# Patient Record
Sex: MALE | Race: NATIVE HAWAIIAN OR OTHER PACIFIC ISLANDER | NOT HISPANIC OR LATINO
[De-identification: names, ages, dates, MRNs, and addresses within clinical notes are randomized per-mention and may not be internally consistent; named-entity substitution may affect disease eponyms.]

---

## 2017-12-08 PROBLEM — Z00.00 ENCOUNTER FOR PREVENTIVE HEALTH EXAMINATION: Status: ACTIVE | Noted: 2017-12-08

## 2017-12-11 ENCOUNTER — APPOINTMENT (OUTPATIENT)
Dept: CARDIOLOGY | Facility: CLINIC | Age: 72
End: 2017-12-11
Payer: COMMERCIAL

## 2017-12-11 ENCOUNTER — NON-APPOINTMENT (OUTPATIENT)
Age: 72
End: 2017-12-11

## 2017-12-11 VITALS
OXYGEN SATURATION: 97 % | WEIGHT: 188 LBS | SYSTOLIC BLOOD PRESSURE: 150 MMHG | DIASTOLIC BLOOD PRESSURE: 84 MMHG | HEIGHT: 70.5 IN | HEART RATE: 61 BPM | BODY MASS INDEX: 26.62 KG/M2

## 2017-12-11 DIAGNOSIS — Z78.9 OTHER SPECIFIED HEALTH STATUS: ICD-10-CM

## 2017-12-11 DIAGNOSIS — R73.03 PREDIABETES.: ICD-10-CM

## 2017-12-11 DIAGNOSIS — Z87.19 PERSONAL HISTORY OF OTHER DISEASES OF THE DIGESTIVE SYSTEM: ICD-10-CM

## 2017-12-11 PROCEDURE — 93000 ELECTROCARDIOGRAM COMPLETE: CPT

## 2017-12-11 PROCEDURE — 99204 OFFICE O/P NEW MOD 45 MIN: CPT

## 2017-12-13 ENCOUNTER — APPOINTMENT (OUTPATIENT)
Dept: CARDIOLOGY | Facility: CLINIC | Age: 72
End: 2017-12-13
Payer: COMMERCIAL

## 2017-12-13 PROCEDURE — 93015 CV STRESS TEST SUPVJ I&R: CPT

## 2017-12-20 ENCOUNTER — APPOINTMENT (OUTPATIENT)
Dept: CARDIOLOGY | Facility: CLINIC | Age: 72
End: 2017-12-20
Payer: COMMERCIAL

## 2017-12-20 PROCEDURE — 93306 TTE W/DOPPLER COMPLETE: CPT

## 2018-01-16 ENCOUNTER — APPOINTMENT (OUTPATIENT)
Dept: CARDIOLOGY | Facility: CLINIC | Age: 73
End: 2018-01-16

## 2018-03-01 ENCOUNTER — APPOINTMENT (OUTPATIENT)
Dept: CARDIOLOGY | Facility: CLINIC | Age: 73
End: 2018-03-01
Payer: COMMERCIAL

## 2018-03-01 PROCEDURE — 93306 TTE W/DOPPLER COMPLETE: CPT

## 2018-03-22 ENCOUNTER — APPOINTMENT (OUTPATIENT)
Dept: CARDIOLOGY | Facility: CLINIC | Age: 73
End: 2018-03-22
Payer: COMMERCIAL

## 2018-03-22 VITALS
BODY MASS INDEX: 26.19 KG/M2 | OXYGEN SATURATION: 96 % | SYSTOLIC BLOOD PRESSURE: 162 MMHG | WEIGHT: 185 LBS | DIASTOLIC BLOOD PRESSURE: 90 MMHG | HEART RATE: 76 BPM | HEIGHT: 70.5 IN

## 2018-03-22 VITALS — SYSTOLIC BLOOD PRESSURE: 140 MMHG | DIASTOLIC BLOOD PRESSURE: 80 MMHG

## 2018-03-22 PROCEDURE — 93000 ELECTROCARDIOGRAM COMPLETE: CPT

## 2018-03-22 PROCEDURE — 99214 OFFICE O/P EST MOD 30 MIN: CPT

## 2018-04-02 ENCOUNTER — RX RENEWAL (OUTPATIENT)
Age: 73
End: 2018-04-02

## 2018-04-03 ENCOUNTER — APPOINTMENT (OUTPATIENT)
Dept: THORACIC SURGERY | Facility: CLINIC | Age: 73
End: 2018-04-03
Payer: COMMERCIAL

## 2018-04-03 VITALS
HEIGHT: 71 IN | OXYGEN SATURATION: 97 % | DIASTOLIC BLOOD PRESSURE: 92 MMHG | BODY MASS INDEX: 25.9 KG/M2 | TEMPERATURE: 98.7 F | RESPIRATION RATE: 15 BRPM | HEART RATE: 74 BPM | WEIGHT: 185 LBS | SYSTOLIC BLOOD PRESSURE: 158 MMHG

## 2018-04-03 DIAGNOSIS — Z85.828 PERSONAL HISTORY OF OTHER MALIGNANT NEOPLASM OF SKIN: ICD-10-CM

## 2018-04-03 PROCEDURE — 99205 OFFICE O/P NEW HI 60 MIN: CPT

## 2018-04-03 RX ORDER — LOSARTAN POTASSIUM 50 MG/1
50 TABLET, FILM COATED ORAL DAILY
Qty: 90 | Refills: 1 | Status: DISCONTINUED | COMMUNITY
Start: 2018-04-02 | End: 2018-04-03

## 2018-04-10 ENCOUNTER — APPOINTMENT (OUTPATIENT)
Dept: PULMONOLOGY | Facility: CLINIC | Age: 73
End: 2018-04-10

## 2018-04-12 ENCOUNTER — OUTPATIENT (OUTPATIENT)
Dept: OUTPATIENT SERVICES | Facility: HOSPITAL | Age: 73
LOS: 1 days | End: 2018-04-12

## 2018-04-12 VITALS
DIASTOLIC BLOOD PRESSURE: 80 MMHG | HEART RATE: 68 BPM | OXYGEN SATURATION: 97 % | WEIGHT: 182.98 LBS | SYSTOLIC BLOOD PRESSURE: 130 MMHG | TEMPERATURE: 97 F | HEIGHT: 69 IN | RESPIRATION RATE: 16 BRPM

## 2018-04-12 DIAGNOSIS — R91.1 SOLITARY PULMONARY NODULE: ICD-10-CM

## 2018-04-12 DIAGNOSIS — K86.2 CYST OF PANCREAS: Chronic | ICD-10-CM

## 2018-04-12 DIAGNOSIS — I77.810 THORACIC AORTIC ECTASIA: ICD-10-CM

## 2018-04-12 DIAGNOSIS — Z98.890 OTHER SPECIFIED POSTPROCEDURAL STATES: Chronic | ICD-10-CM

## 2018-04-12 DIAGNOSIS — I10 ESSENTIAL (PRIMARY) HYPERTENSION: ICD-10-CM

## 2018-04-12 DIAGNOSIS — R73.03 PREDIABETES: ICD-10-CM

## 2018-04-12 LAB
ALBUMIN SERPL ELPH-MCNC: 4.3 G/DL — SIGNIFICANT CHANGE UP (ref 3.3–5)
ALP SERPL-CCNC: 65 U/L — SIGNIFICANT CHANGE UP (ref 40–120)
ALT FLD-CCNC: 41 U/L — SIGNIFICANT CHANGE UP (ref 4–41)
AST SERPL-CCNC: 25 U/L — SIGNIFICANT CHANGE UP (ref 4–40)
BILIRUB SERPL-MCNC: 0.4 MG/DL — SIGNIFICANT CHANGE UP (ref 0.2–1.2)
BLD GP AB SCN SERPL QL: NEGATIVE — SIGNIFICANT CHANGE UP
BUN SERPL-MCNC: 29 MG/DL — HIGH (ref 7–23)
CALCIUM SERPL-MCNC: 9.2 MG/DL — SIGNIFICANT CHANGE UP (ref 8.4–10.5)
CHLORIDE SERPL-SCNC: 100 MMOL/L — SIGNIFICANT CHANGE UP (ref 98–107)
CO2 SERPL-SCNC: 24 MMOL/L — SIGNIFICANT CHANGE UP (ref 22–31)
CREAT SERPL-MCNC: 0.99 MG/DL — SIGNIFICANT CHANGE UP (ref 0.5–1.3)
GLUCOSE SERPL-MCNC: 101 MG/DL — HIGH (ref 70–99)
HBA1C BLD-MCNC: 5.7 % — HIGH (ref 4–5.6)
HCT VFR BLD CALC: 47.3 % — SIGNIFICANT CHANGE UP (ref 39–50)
HGB BLD-MCNC: 15.5 G/DL — SIGNIFICANT CHANGE UP (ref 13–17)
MCHC RBC-ENTMCNC: 27.6 PG — SIGNIFICANT CHANGE UP (ref 27–34)
MCHC RBC-ENTMCNC: 32.8 % — SIGNIFICANT CHANGE UP (ref 32–36)
MCV RBC AUTO: 84.3 FL — SIGNIFICANT CHANGE UP (ref 80–100)
NRBC # FLD: 0 — SIGNIFICANT CHANGE UP
PLATELET # BLD AUTO: 258 K/UL — SIGNIFICANT CHANGE UP (ref 150–400)
PMV BLD: 11.2 FL — SIGNIFICANT CHANGE UP (ref 7–13)
POTASSIUM SERPL-MCNC: 4 MMOL/L — SIGNIFICANT CHANGE UP (ref 3.5–5.3)
POTASSIUM SERPL-SCNC: 4 MMOL/L — SIGNIFICANT CHANGE UP (ref 3.5–5.3)
PROT SERPL-MCNC: 7.6 G/DL — SIGNIFICANT CHANGE UP (ref 6–8.3)
RBC # BLD: 5.61 M/UL — SIGNIFICANT CHANGE UP (ref 4.2–5.8)
RBC # FLD: 12.8 % — SIGNIFICANT CHANGE UP (ref 10.3–14.5)
RH IG SCN BLD-IMP: POSITIVE — SIGNIFICANT CHANGE UP
SODIUM SERPL-SCNC: 136 MMOL/L — SIGNIFICANT CHANGE UP (ref 135–145)
WBC # BLD: 6.37 K/UL — SIGNIFICANT CHANGE UP (ref 3.8–10.5)
WBC # FLD AUTO: 6.37 K/UL — SIGNIFICANT CHANGE UP (ref 3.8–10.5)

## 2018-04-12 RX ORDER — SODIUM CHLORIDE 9 MG/ML
1000 INJECTION, SOLUTION INTRAVENOUS
Qty: 0 | Refills: 0 | Status: DISCONTINUED | OUTPATIENT
Start: 2018-04-19 | End: 2018-04-21

## 2018-04-12 RX ORDER — SODIUM CHLORIDE 9 MG/ML
3 INJECTION INTRAMUSCULAR; INTRAVENOUS; SUBCUTANEOUS EVERY 8 HOURS
Qty: 0 | Refills: 0 | Status: DISCONTINUED | OUTPATIENT
Start: 2018-04-19 | End: 2018-04-22

## 2018-04-12 NOTE — H&P PST ADULT - NEGATIVE CARDIOVASCULAR SYMPTOMS
no paroxysmal nocturnal dyspnea/no palpitations/no orthopnea/no peripheral edema/no chest pain/no dyspnea on exertion/no claudication

## 2018-04-12 NOTE — H&P PST ADULT - MUSCULOSKELETAL
negative detailed exam ROM intact/no joint erythema/no joint warmth/no calf tenderness/normal strength/no joint swelling

## 2018-04-12 NOTE — H&P PST ADULT - LYMPHATIC
anterior cervical L/posterior cervical L/posterior cervical R/anterior cervical R/supraclavicular R/supraclavicular L

## 2018-04-12 NOTE — H&P PST ADULT - NSANTHOSAYNRD_GEN_A_CORE
No. [facility] screening performed.  STOP BANG Legend: 0-2 = LOW Risk; 3-4 = INTERMEDIATE Risk; 5-8 = HIGH Risk

## 2018-04-12 NOTE — H&P PST ADULT - PROBLEM SELECTOR PLAN 1
Bronchoscopy, Right Video Assisted Thoracoscopy, Lung Resection with Biopsy scheduled for 4/19/2018.  Pre-op instructions given. Pt verbalized understanding   Pepcid given for GI prophylaxis  Chlorhexidine wash instructions given  Pending: Cardiology clearance  Copy of Stress, Echo, PFT and EKG in chart

## 2018-04-12 NOTE — H&P PST ADULT - HISTORY OF PRESENT ILLNESS
71yo male with medical h/o Pre-DM, HTN, pt reports during Cardiology evaluation - MRA of aorta, Lung mass noted. Pt presents today for presurgical testing for Bronchoscopy, Right Video Assisted Thoracoscopy, Lung Resection with Biopsy scheduled for 4/19/2018.

## 2018-04-12 NOTE — H&P PST ADULT - PMH
Hypertension    Spinal stenosis Dilated aortic root    Hypertension    Prediabetes    Spinal stenosis

## 2018-04-12 NOTE — H&P PST ADULT - FAMILY HISTORY
Mother  Still living? No  Family history of bladder cancer, Age at diagnosis: Age Unknown     Father  Still living? No  Family history of emphysema, Age at diagnosis: Age Unknown

## 2018-04-18 ENCOUNTER — TRANSCRIPTION ENCOUNTER (OUTPATIENT)
Age: 73
End: 2018-04-18

## 2018-04-19 ENCOUNTER — RESULT REVIEW (OUTPATIENT)
Age: 73
End: 2018-04-19

## 2018-04-19 ENCOUNTER — APPOINTMENT (OUTPATIENT)
Dept: THORACIC SURGERY | Facility: HOSPITAL | Age: 73
End: 2018-04-19
Payer: COMMERCIAL

## 2018-04-19 ENCOUNTER — INPATIENT (INPATIENT)
Facility: HOSPITAL | Age: 73
LOS: 2 days | Discharge: HOME | End: 2018-04-22
Attending: THORACIC SURGERY (CARDIOTHORACIC VASCULAR SURGERY) | Admitting: THORACIC SURGERY (CARDIOTHORACIC VASCULAR SURGERY)
Payer: COMMERCIAL

## 2018-04-19 VITALS
HEART RATE: 65 BPM | TEMPERATURE: 99 F | WEIGHT: 182.98 LBS | DIASTOLIC BLOOD PRESSURE: 84 MMHG | HEIGHT: 69 IN | OXYGEN SATURATION: 96 % | SYSTOLIC BLOOD PRESSURE: 144 MMHG | RESPIRATION RATE: 16 BRPM

## 2018-04-19 DIAGNOSIS — R91.1 SOLITARY PULMONARY NODULE: ICD-10-CM

## 2018-04-19 DIAGNOSIS — K86.2 CYST OF PANCREAS: Chronic | ICD-10-CM

## 2018-04-19 DIAGNOSIS — Z98.890 OTHER SPECIFIED POSTPROCEDURAL STATES: Chronic | ICD-10-CM

## 2018-04-19 LAB
GLUCOSE BLDC GLUCOMTR-MCNC: 122 MG/DL — HIGH (ref 70–99)
GLUCOSE BLDC GLUCOMTR-MCNC: 133 MG/DL — HIGH (ref 70–99)
GLUCOSE BLDC GLUCOMTR-MCNC: 145 MG/DL — HIGH (ref 70–99)
RH IG SCN BLD-IMP: POSITIVE — SIGNIFICANT CHANGE UP

## 2018-04-19 PROCEDURE — 88331 PATH CONSLTJ SURG 1 BLK 1SPC: CPT | Mod: 26

## 2018-04-19 PROCEDURE — 71045 X-RAY EXAM CHEST 1 VIEW: CPT | Mod: 26

## 2018-04-19 PROCEDURE — 31622 DX BRONCHOSCOPE/WASH: CPT

## 2018-04-19 PROCEDURE — 32663 THORACOSCOPY W/LOBECTOMY: CPT

## 2018-04-19 PROCEDURE — 88305 TISSUE EXAM BY PATHOLOGIST: CPT | Mod: 26

## 2018-04-19 PROCEDURE — 88341 IMHCHEM/IMCYTCHM EA ADD ANTB: CPT | Mod: 26

## 2018-04-19 PROCEDURE — 32674 THORACOSCOPY LYMPH NODE EXC: CPT

## 2018-04-19 PROCEDURE — 88313 SPECIAL STAINS GROUP 2: CPT | Mod: 26

## 2018-04-19 PROCEDURE — S2900 ROBOTIC SURGICAL SYSTEM: CPT | Mod: NC

## 2018-04-19 PROCEDURE — 99233 SBSQ HOSP IP/OBS HIGH 50: CPT

## 2018-04-19 PROCEDURE — 88332 PATH CONSLTJ SURG EA ADD BLK: CPT | Mod: 26

## 2018-04-19 PROCEDURE — 88342 IMHCHEM/IMCYTCHM 1ST ANTB: CPT | Mod: 26

## 2018-04-19 PROCEDURE — 88309 TISSUE EXAM BY PATHOLOGIST: CPT | Mod: 26

## 2018-04-19 RX ORDER — HEPARIN SODIUM 5000 [USP'U]/ML
5000 INJECTION INTRAVENOUS; SUBCUTANEOUS EVERY 8 HOURS
Qty: 0 | Refills: 0 | Status: DISCONTINUED | OUTPATIENT
Start: 2018-04-19 | End: 2018-04-22

## 2018-04-19 RX ORDER — ATENOLOL 25 MG/1
1 TABLET ORAL
Qty: 0 | Refills: 0 | COMMUNITY

## 2018-04-19 RX ORDER — DEXTROSE 50 % IN WATER 50 %
1 SYRINGE (ML) INTRAVENOUS ONCE
Qty: 0 | Refills: 0 | Status: DISCONTINUED | OUTPATIENT
Start: 2018-04-19 | End: 2018-04-22

## 2018-04-19 RX ORDER — DEXTROSE 50 % IN WATER 50 %
25 SYRINGE (ML) INTRAVENOUS ONCE
Qty: 0 | Refills: 0 | Status: DISCONTINUED | OUTPATIENT
Start: 2018-04-19 | End: 2018-04-22

## 2018-04-19 RX ORDER — ONDANSETRON 8 MG/1
4 TABLET, FILM COATED ORAL EVERY 6 HOURS
Qty: 0 | Refills: 0 | Status: DISCONTINUED | OUTPATIENT
Start: 2018-04-19 | End: 2018-04-22

## 2018-04-19 RX ORDER — FAMOTIDINE 10 MG/ML
20 INJECTION INTRAVENOUS EVERY 12 HOURS
Qty: 0 | Refills: 0 | Status: DISCONTINUED | OUTPATIENT
Start: 2018-04-19 | End: 2018-04-22

## 2018-04-19 RX ORDER — HYDROMORPHONE HYDROCHLORIDE 2 MG/ML
0.5 INJECTION INTRAMUSCULAR; INTRAVENOUS; SUBCUTANEOUS
Qty: 0 | Refills: 0 | Status: DISCONTINUED | OUTPATIENT
Start: 2018-04-19 | End: 2018-04-21

## 2018-04-19 RX ORDER — HYDROMORPHONE HYDROCHLORIDE 2 MG/ML
30 INJECTION INTRAMUSCULAR; INTRAVENOUS; SUBCUTANEOUS
Qty: 0 | Refills: 0 | Status: DISCONTINUED | OUTPATIENT
Start: 2018-04-19 | End: 2018-04-21

## 2018-04-19 RX ORDER — GLUCAGON INJECTION, SOLUTION 0.5 MG/.1ML
1 INJECTION, SOLUTION SUBCUTANEOUS ONCE
Qty: 0 | Refills: 0 | Status: DISCONTINUED | OUTPATIENT
Start: 2018-04-19 | End: 2018-04-22

## 2018-04-19 RX ORDER — DOCUSATE SODIUM 100 MG
100 CAPSULE ORAL THREE TIMES A DAY
Qty: 0 | Refills: 0 | Status: DISCONTINUED | OUTPATIENT
Start: 2018-04-19 | End: 2018-04-22

## 2018-04-19 RX ORDER — NALOXONE HYDROCHLORIDE 4 MG/.1ML
0.1 SPRAY NASAL
Qty: 0 | Refills: 0 | Status: DISCONTINUED | OUTPATIENT
Start: 2018-04-19 | End: 2018-04-22

## 2018-04-19 RX ORDER — SODIUM CHLORIDE 9 MG/ML
1000 INJECTION, SOLUTION INTRAVENOUS
Qty: 0 | Refills: 0 | Status: DISCONTINUED | OUTPATIENT
Start: 2018-04-19 | End: 2018-04-22

## 2018-04-19 RX ORDER — LOSARTAN POTASSIUM 100 MG/1
1 TABLET, FILM COATED ORAL
Qty: 0 | Refills: 0 | COMMUNITY

## 2018-04-19 RX ORDER — DEXTROSE 50 % IN WATER 50 %
12.5 SYRINGE (ML) INTRAVENOUS ONCE
Qty: 0 | Refills: 0 | Status: DISCONTINUED | OUTPATIENT
Start: 2018-04-19 | End: 2018-04-22

## 2018-04-19 RX ORDER — SENNA PLUS 8.6 MG/1
2 TABLET ORAL AT BEDTIME
Qty: 0 | Refills: 0 | Status: DISCONTINUED | OUTPATIENT
Start: 2018-04-19 | End: 2018-04-22

## 2018-04-19 RX ORDER — INSULIN LISPRO 100/ML
VIAL (ML) SUBCUTANEOUS AT BEDTIME
Qty: 0 | Refills: 0 | Status: DISCONTINUED | OUTPATIENT
Start: 2018-04-19 | End: 2018-04-22

## 2018-04-19 RX ORDER — INSULIN LISPRO 100/ML
VIAL (ML) SUBCUTANEOUS
Qty: 0 | Refills: 0 | Status: DISCONTINUED | OUTPATIENT
Start: 2018-04-19 | End: 2018-04-22

## 2018-04-19 RX ADMIN — HYDROMORPHONE HYDROCHLORIDE 30 MILLILITER(S): 2 INJECTION INTRAMUSCULAR; INTRAVENOUS; SUBCUTANEOUS at 19:26

## 2018-04-19 RX ADMIN — SODIUM CHLORIDE 3 MILLILITER(S): 9 INJECTION INTRAMUSCULAR; INTRAVENOUS; SUBCUTANEOUS at 14:49

## 2018-04-19 RX ADMIN — SENNA PLUS 2 TABLET(S): 8.6 TABLET ORAL at 22:36

## 2018-04-19 RX ADMIN — SODIUM CHLORIDE 3 MILLILITER(S): 9 INJECTION INTRAMUSCULAR; INTRAVENOUS; SUBCUTANEOUS at 22:29

## 2018-04-19 RX ADMIN — FAMOTIDINE 20 MILLIGRAM(S): 10 INJECTION INTRAVENOUS at 17:18

## 2018-04-19 RX ADMIN — HEPARIN SODIUM 5000 UNIT(S): 5000 INJECTION INTRAVENOUS; SUBCUTANEOUS at 14:48

## 2018-04-19 RX ADMIN — Medication 100 MILLIGRAM(S): at 22:35

## 2018-04-19 RX ADMIN — HEPARIN SODIUM 5000 UNIT(S): 5000 INJECTION INTRAVENOUS; SUBCUTANEOUS at 22:35

## 2018-04-19 RX ADMIN — HYDROMORPHONE HYDROCHLORIDE 30 MILLILITER(S): 2 INJECTION INTRAMUSCULAR; INTRAVENOUS; SUBCUTANEOUS at 13:45

## 2018-04-19 RX ADMIN — SODIUM CHLORIDE 30 MILLILITER(S): 9 INJECTION, SOLUTION INTRAVENOUS at 19:25

## 2018-04-19 RX ADMIN — Medication 100 MILLIGRAM(S): at 14:48

## 2018-04-19 RX ADMIN — SODIUM CHLORIDE 30 MILLILITER(S): 9 INJECTION, SOLUTION INTRAVENOUS at 13:45

## 2018-04-19 NOTE — BRIEF OPERATIVE NOTE - PROCEDURE
<<-----Click on this checkbox to enter Procedure Talc pleurodesis  04/19/2018    Active  DAYSI  Drainage of pleural effusion  04/19/2018  3600 ml  Active  DAYSI  VATS (video-assisted thoracoscopic surgery)  04/19/2018    Active  DAYSI Lymph node dissection  04/21/2018    Active  [id number]  Wedge excision of lung  04/21/2018  RUL  Active  [id number]

## 2018-04-19 NOTE — PROGRESS NOTE ADULT - SUBJECTIVE AND OBJECTIVE BOX
GRETCHEN AQUINO                     [id number]    HPI:  71yo male with medical h/o Pre-DM, HTN, pt reports during Cardiology evaluation - MRA of aorta, Lung mass noted. Pt presents today for presurgical testing for Bronchoscopy, Right Video Assisted Thoracoscopy, Lung Resection with Biopsy scheduled for 4/19/2018. (12 Apr 2018 17:48)      Procedure: Talc pleurodesis  04/19/2018      Drainage of pleural effusion  04/19/2018  3600 ml   VATS (video-assisted thoracoscopic surgery)   POD # : 0    Issues:  Lung mass  post op pain  HTN      PAST MEDICAL & SURGICAL HISTORY:  Dilated aortic root  Prediabetes  Spinal stenosis  Hypertension  Pancreas cyst: removal  H/O laminectomy: 2017            VITAL SIGNS:  Vital Signs Last 24 Hrs  T(C): 36.3 (19 Apr 2018 13:45), Max: 37 (19 Apr 2018 09:28)  T(F): 97.3 (19 Apr 2018 13:45), Max: 98.6 (19 Apr 2018 09:28)  HR: 72 (19 Apr 2018 15:15) (65 - 84)  BP: 120/74 (19 Apr 2018 15:15) (99/80 - 144/84)  BP(mean): 85 (19 Apr 2018 15:15) (83 - 89)  RR: 19 (19 Apr 2018 15:15) (14 - 19)  SpO2: 98% (19 Apr 2018 15:15) (95% - 98%)    I/Os:   I&O's Detail    19 Apr 2018 07:01  -  19 Apr 2018 16:29  --------------------------------------------------------  IN:    lactated ringers.: 90 mL  Total IN: 90 mL    OUT:    Chest Tube: 10 mL  Total OUT: 10 mL    Total NET: 80 mL          CAPILLARY BLOOD GLUCOSE      POCT Blood Glucose.: 122 mg/dL (19 Apr 2018 10:30)      =======================MEDICATIONS===================  MEDICATIONS  (STANDING):  dextrose 5%. 1000 milliLiter(s) (50 mL/Hr) IV Continuous <Continuous>  dextrose 50% Injectable 12.5 Gram(s) IV Push once  dextrose 50% Injectable 25 Gram(s) IV Push once  dextrose 50% Injectable 25 Gram(s) IV Push once  docusate sodium 100 milliGRAM(s) Oral three times a day  famotidine    Tablet 20 milliGRAM(s) Oral every 12 hours  heparin  Injectable 5000 Unit(s) SubCutaneous every 8 hours  HYDROmorphone PCA (1 mG/mL) 30 milliLiter(s) PCA Continuous PCA Continuous  insulin lispro (HumaLOG) corrective regimen sliding scale   SubCutaneous three times a day before meals  insulin lispro (HumaLOG) corrective regimen sliding scale   SubCutaneous at bedtime  lactated ringers. 1000 milliLiter(s) (30 mL/Hr) IV Continuous <Continuous>  senna 2 Tablet(s) Oral at bedtime  sodium chloride 0.9% lock flush 3 milliLiter(s) IV Push every 8 hours    MEDICATIONS  (PRN):  dextrose Gel 1 Dose(s) Oral once PRN Blood Glucose LESS THAN 70 milliGRAM(s)/deciliter  glucagon  Injectable 1 milliGRAM(s) IntraMuscular once PRN Glucose LESS THAN 70 milligrams/deciliter  HYDROmorphone PCA (1 mG/mL) Rescue Clinician Bolus 0.5 milliGRAM(s) IV Push every 15 minutes PRN for Pain Scale GREATER THAN 6  naloxone Injectable 0.1 milliGRAM(s) IV Push every 3 minutes PRN For ANY of the following changes in patient status:  A. RR LESS THAN 10 breaths per minute, B. Oxygen saturation LESS THAN 90%, C. Sedation score of 6  ondansetron Injectable 4 milliGRAM(s) IV Push every 6 hours PRN Nausea          ==================PHYSICAL EXAM============================  General:                         Awake, alert, not in any distress  Neuro:                            Moving all extremities to commands.   Respiratory:	Air entry fair and  bilateral conducted sounds                                         Chest tube to water seal  CV:		Regular rate and rhythm. Normal S1/S2                                          Distal pulses present.  Abdomen:	                     Soft, non-distended. Bowel sounds present   Skin:		No rash.  Extremities:	Warm, no cyanosis or edema.  Palpable pulses        =============================NEUROLOGY============================  Pain control with PCA / Tylenol IV     ==============================RESPIRATORY========================  Pt is on 2 L nasal canula   Comfortable, not in any distress.  Using incentive spirometry   Monitor chest tube output  Chest tube to  water seal	  Continue bronchodilators, pulmonary toilet    ============================CARDIOVASCULAR======================  Continue hemodynamic monitoring.  Not on any pressors    =====================RENAL===================  Continue LR 30CC/hr    Monitor I/Os and electrolytes    ====================GASTROINTESTINAL===================  On clears, tolerating  Continue GI prophylaxis with  Protonix  Continue Zofran / Reglan for nausea - PRN	    ========================HEMATOLOGIC/ONCOLOGIC====================  Monitor chest tube output. No signs of active bleeding.   Follow CBC in AM    ============================INFECTIOUS DISEASE========================  Monitor for fever / leukocytosis.  All surgical incision / chest tube  sites look clean      Pt is on GI & DVT prophylaxis  OOB & ambulate       Pertinent clinical, laboratory, radiographic, hemodynamic, echocardiographic, respiratory data, microbiologic data and chart were reviewed and analyzed frequently throughout the course of the day and night  Patient seen, examined and plan discussed with CT Surgery / CTICU team during rounds.    Pt's status discussed with family at bedside, updated status        Adelineupalva Israel DO FACEP GRETCHEN AQUINO                     [id number]    HPI:  73yo male with medical h/o Pre-DM, HTN, pt reports during Cardiology evaluation - MRA of aorta, Lung mass noted. Pt presents today for presurgical testing for Bronchoscopy, Right Video Assisted Thoracoscopy, Lung Resection with Biopsy scheduled for 4/19/2018. (12 Apr 2018 17:48)      Procedure: VATS (video-assisted thoracoscopic surgery) , Lung resection  POD # : 0    Issues:  Lung mass  post op pain  HTN      PAST MEDICAL & SURGICAL HISTORY:  Dilated aortic root  Prediabetes  Spinal stenosis  Hypertension  Pancreas cyst: removal  H/O laminectomy: 2017            VITAL SIGNS:  Vital Signs Last 24 Hrs  T(C): 36.3 (19 Apr 2018 13:45), Max: 37 (19 Apr 2018 09:28)  T(F): 97.3 (19 Apr 2018 13:45), Max: 98.6 (19 Apr 2018 09:28)  HR: 72 (19 Apr 2018 15:15) (65 - 84)  BP: 120/74 (19 Apr 2018 15:15) (99/80 - 144/84)  BP(mean): 85 (19 Apr 2018 15:15) (83 - 89)  RR: 19 (19 Apr 2018 15:15) (14 - 19)  SpO2: 98% (19 Apr 2018 15:15) (95% - 98%)    I/Os:   I&O's Detail    19 Apr 2018 07:01  -  19 Apr 2018 16:29  --------------------------------------------------------  IN:    lactated ringers.: 90 mL  Total IN: 90 mL    OUT:    Chest Tube: 10 mL  Total OUT: 10 mL    Total NET: 80 mL          CAPILLARY BLOOD GLUCOSE      POCT Blood Glucose.: 122 mg/dL (19 Apr 2018 10:30)      =======================MEDICATIONS===================  MEDICATIONS  (STANDING):  dextrose 5%. 1000 milliLiter(s) (50 mL/Hr) IV Continuous <Continuous>  dextrose 50% Injectable 12.5 Gram(s) IV Push once  dextrose 50% Injectable 25 Gram(s) IV Push once  dextrose 50% Injectable 25 Gram(s) IV Push once  docusate sodium 100 milliGRAM(s) Oral three times a day  famotidine    Tablet 20 milliGRAM(s) Oral every 12 hours  heparin  Injectable 5000 Unit(s) SubCutaneous every 8 hours  HYDROmorphone PCA (1 mG/mL) 30 milliLiter(s) PCA Continuous PCA Continuous  insulin lispro (HumaLOG) corrective regimen sliding scale   SubCutaneous three times a day before meals  insulin lispro (HumaLOG) corrective regimen sliding scale   SubCutaneous at bedtime  lactated ringers. 1000 milliLiter(s) (30 mL/Hr) IV Continuous <Continuous>  senna 2 Tablet(s) Oral at bedtime  sodium chloride 0.9% lock flush 3 milliLiter(s) IV Push every 8 hours    MEDICATIONS  (PRN):  dextrose Gel 1 Dose(s) Oral once PRN Blood Glucose LESS THAN 70 milliGRAM(s)/deciliter  glucagon  Injectable 1 milliGRAM(s) IntraMuscular once PRN Glucose LESS THAN 70 milligrams/deciliter  HYDROmorphone PCA (1 mG/mL) Rescue Clinician Bolus 0.5 milliGRAM(s) IV Push every 15 minutes PRN for Pain Scale GREATER THAN 6  naloxone Injectable 0.1 milliGRAM(s) IV Push every 3 minutes PRN For ANY of the following changes in patient status:  A. RR LESS THAN 10 breaths per minute, B. Oxygen saturation LESS THAN 90%, C. Sedation score of 6  ondansetron Injectable 4 milliGRAM(s) IV Push every 6 hours PRN Nausea          ==================PHYSICAL EXAM============================  General:                         Awake, alert, not in any distress  Neuro:                            Moving all extremities to commands.   Respiratory:	Air entry fair and  bilateral conducted sounds                                         Chest tube to water seal  CV:		Regular rate and rhythm. Normal S1/S2                                          Distal pulses present.  Abdomen:	                     Soft, non-distended. Bowel sounds present   Skin:		No rash.  Extremities:	Warm, no cyanosis or edema.  Palpable pulses        =============================NEUROLOGY============================  Pain control with PCA / Tylenol IV     ==============================RESPIRATORY========================  Pt is on 2 L nasal canula   Comfortable, not in any distress.  Using incentive spirometry   Monitor chest tube output  Chest tube to  water seal	  Continue bronchodilators, pulmonary toilet    ============================CARDIOVASCULAR======================  Continue hemodynamic monitoring.  Not on any pressors    =====================RENAL===================  Continue LR 30CC/hr    Monitor I/Os and electrolytes    ====================GASTROINTESTINAL===================  On clears, tolerating  Continue GI prophylaxis with  Protonix  Continue Zofran / Reglan for nausea - PRN	    ========================HEMATOLOGIC/ONCOLOGIC====================  Monitor chest tube output. No signs of active bleeding.   Follow CBC in AM    ============================INFECTIOUS DISEASE========================  Monitor for fever / leukocytosis.  All surgical incision / chest tube  sites look clean      Pt is on GI & DVT prophylaxis  OOB & ambulate       Pertinent clinical, laboratory, radiographic, hemodynamic, echocardiographic, respiratory data, microbiologic data and chart were reviewed and analyzed frequently throughout the course of the day and night  Patient seen, examined and plan discussed with CT Surgery / CTICU team during rounds.    Pt's status discussed with family at bedside, updated status        Qinateing Jhonatan DO FACEP

## 2018-04-20 LAB
BASOPHILS # BLD AUTO: 0.01 K/UL — SIGNIFICANT CHANGE UP (ref 0–0.2)
BASOPHILS NFR BLD AUTO: 0.1 % — SIGNIFICANT CHANGE UP (ref 0–2)
BUN SERPL-MCNC: 18 MG/DL — SIGNIFICANT CHANGE UP (ref 7–23)
CALCIUM SERPL-MCNC: 8.3 MG/DL — LOW (ref 8.4–10.5)
CHLORIDE SERPL-SCNC: 96 MMOL/L — LOW (ref 98–107)
CO2 SERPL-SCNC: 25 MMOL/L — SIGNIFICANT CHANGE UP (ref 22–31)
CREAT SERPL-MCNC: 1.04 MG/DL — SIGNIFICANT CHANGE UP (ref 0.5–1.3)
EOSINOPHIL # BLD AUTO: 0.02 K/UL — SIGNIFICANT CHANGE UP (ref 0–0.5)
EOSINOPHIL NFR BLD AUTO: 0.2 % — SIGNIFICANT CHANGE UP (ref 0–6)
GLUCOSE BLDC GLUCOMTR-MCNC: 105 MG/DL — HIGH (ref 70–99)
GLUCOSE BLDC GLUCOMTR-MCNC: 117 MG/DL — HIGH (ref 70–99)
GLUCOSE BLDC GLUCOMTR-MCNC: 126 MG/DL — HIGH (ref 70–99)
GLUCOSE BLDC GLUCOMTR-MCNC: 165 MG/DL — HIGH (ref 70–99)
GLUCOSE SERPL-MCNC: 128 MG/DL — HIGH (ref 70–99)
HCT VFR BLD CALC: 45.2 % — SIGNIFICANT CHANGE UP (ref 39–50)
HGB BLD-MCNC: 14.5 G/DL — SIGNIFICANT CHANGE UP (ref 13–17)
IMM GRANULOCYTES # BLD AUTO: 0.03 # — SIGNIFICANT CHANGE UP
IMM GRANULOCYTES NFR BLD AUTO: 0.3 % — SIGNIFICANT CHANGE UP (ref 0–1.5)
LYMPHOCYTES # BLD AUTO: 0.89 K/UL — LOW (ref 1–3.3)
LYMPHOCYTES # BLD AUTO: 9.9 % — LOW (ref 13–44)
MCHC RBC-ENTMCNC: 27.7 PG — SIGNIFICANT CHANGE UP (ref 27–34)
MCHC RBC-ENTMCNC: 32.1 % — SIGNIFICANT CHANGE UP (ref 32–36)
MCV RBC AUTO: 86.3 FL — SIGNIFICANT CHANGE UP (ref 80–100)
MONOCYTES # BLD AUTO: 0.8 K/UL — SIGNIFICANT CHANGE UP (ref 0–0.9)
MONOCYTES NFR BLD AUTO: 8.9 % — SIGNIFICANT CHANGE UP (ref 2–14)
NEUTROPHILS # BLD AUTO: 7.22 K/UL — SIGNIFICANT CHANGE UP (ref 1.8–7.4)
NEUTROPHILS NFR BLD AUTO: 80.6 % — HIGH (ref 43–77)
NRBC # FLD: 0 — SIGNIFICANT CHANGE UP
PLATELET # BLD AUTO: 241 K/UL — SIGNIFICANT CHANGE UP (ref 150–400)
PMV BLD: 11.1 FL — SIGNIFICANT CHANGE UP (ref 7–13)
POTASSIUM SERPL-MCNC: 4.1 MMOL/L — SIGNIFICANT CHANGE UP (ref 3.5–5.3)
POTASSIUM SERPL-SCNC: 4.1 MMOL/L — SIGNIFICANT CHANGE UP (ref 3.5–5.3)
RBC # BLD: 5.24 M/UL — SIGNIFICANT CHANGE UP (ref 4.2–5.8)
RBC # FLD: 13 % — SIGNIFICANT CHANGE UP (ref 10.3–14.5)
SODIUM SERPL-SCNC: 134 MMOL/L — LOW (ref 135–145)
WBC # BLD: 8.97 K/UL — SIGNIFICANT CHANGE UP (ref 3.8–10.5)
WBC # FLD AUTO: 8.97 K/UL — SIGNIFICANT CHANGE UP (ref 3.8–10.5)

## 2018-04-20 PROCEDURE — 99233 SBSQ HOSP IP/OBS HIGH 50: CPT

## 2018-04-20 PROCEDURE — 71045 X-RAY EXAM CHEST 1 VIEW: CPT | Mod: 26

## 2018-04-20 RX ORDER — MAGNESIUM SULFATE 500 MG/ML
2 VIAL (ML) INJECTION ONCE
Qty: 0 | Refills: 0 | Status: COMPLETED | OUTPATIENT
Start: 2018-04-20 | End: 2018-04-20

## 2018-04-20 RX ORDER — ALBUTEROL 90 UG/1
2 AEROSOL, METERED ORAL EVERY 4 HOURS
Qty: 0 | Refills: 0 | Status: DISCONTINUED | OUTPATIENT
Start: 2018-04-20 | End: 2018-04-21

## 2018-04-20 RX ORDER — POTASSIUM CHLORIDE 20 MEQ
10 PACKET (EA) ORAL ONCE
Qty: 0 | Refills: 0 | Status: COMPLETED | OUTPATIENT
Start: 2018-04-20 | End: 2018-04-20

## 2018-04-20 RX ORDER — ACETAMINOPHEN 500 MG
1000 TABLET ORAL ONCE
Qty: 0 | Refills: 0 | Status: COMPLETED | OUTPATIENT
Start: 2018-04-20 | End: 2018-04-20

## 2018-04-20 RX ORDER — IPRATROPIUM BROMIDE 0.2 MG/ML
500 SOLUTION, NON-ORAL INHALATION EVERY 6 HOURS
Qty: 0 | Refills: 0 | Status: DISCONTINUED | OUTPATIENT
Start: 2018-04-20 | End: 2018-04-22

## 2018-04-20 RX ADMIN — Medication 100 MILLIGRAM(S): at 13:11

## 2018-04-20 RX ADMIN — SODIUM CHLORIDE 3 MILLILITER(S): 9 INJECTION INTRAMUSCULAR; INTRAVENOUS; SUBCUTANEOUS at 21:17

## 2018-04-20 RX ADMIN — HYDROMORPHONE HYDROCHLORIDE 30 MILLILITER(S): 2 INJECTION INTRAMUSCULAR; INTRAVENOUS; SUBCUTANEOUS at 19:09

## 2018-04-20 RX ADMIN — HEPARIN SODIUM 5000 UNIT(S): 5000 INJECTION INTRAVENOUS; SUBCUTANEOUS at 13:10

## 2018-04-20 RX ADMIN — HYDROMORPHONE HYDROCHLORIDE 30 MILLILITER(S): 2 INJECTION INTRAMUSCULAR; INTRAVENOUS; SUBCUTANEOUS at 19:08

## 2018-04-20 RX ADMIN — FAMOTIDINE 20 MILLIGRAM(S): 10 INJECTION INTRAVENOUS at 17:04

## 2018-04-20 RX ADMIN — SODIUM CHLORIDE 3 MILLILITER(S): 9 INJECTION INTRAMUSCULAR; INTRAVENOUS; SUBCUTANEOUS at 05:38

## 2018-04-20 RX ADMIN — Medication 1: at 17:04

## 2018-04-20 RX ADMIN — Medication 400 MILLIGRAM(S): at 16:30

## 2018-04-20 RX ADMIN — SODIUM CHLORIDE 30 MILLILITER(S): 9 INJECTION, SOLUTION INTRAVENOUS at 07:11

## 2018-04-20 RX ADMIN — FAMOTIDINE 20 MILLIGRAM(S): 10 INJECTION INTRAVENOUS at 05:38

## 2018-04-20 RX ADMIN — HEPARIN SODIUM 5000 UNIT(S): 5000 INJECTION INTRAVENOUS; SUBCUTANEOUS at 05:38

## 2018-04-20 RX ADMIN — SODIUM CHLORIDE 3 MILLILITER(S): 9 INJECTION INTRAMUSCULAR; INTRAVENOUS; SUBCUTANEOUS at 13:11

## 2018-04-20 RX ADMIN — SODIUM CHLORIDE 30 MILLILITER(S): 9 INJECTION, SOLUTION INTRAVENOUS at 19:07

## 2018-04-20 RX ADMIN — SENNA PLUS 2 TABLET(S): 8.6 TABLET ORAL at 21:22

## 2018-04-20 RX ADMIN — Medication 100 MILLIGRAM(S): at 21:22

## 2018-04-20 RX ADMIN — HYDROMORPHONE HYDROCHLORIDE 30 MILLILITER(S): 2 INJECTION INTRAMUSCULAR; INTRAVENOUS; SUBCUTANEOUS at 07:13

## 2018-04-20 RX ADMIN — Medication 50 GRAM(S): at 23:35

## 2018-04-20 RX ADMIN — Medication 100 MILLIGRAM(S): at 05:38

## 2018-04-20 RX ADMIN — HEPARIN SODIUM 5000 UNIT(S): 5000 INJECTION INTRAVENOUS; SUBCUTANEOUS at 21:22

## 2018-04-20 NOTE — PHYSICAL THERAPY INITIAL EVALUATION ADULT - PERTINENT HX OF CURRENT PROBLEM, REHAB EVAL
This is a 71 y/o M with Solitary pulmonary nodule is now s/p Bronchoscopy, Right Video Assisted Thoracoscopy,

## 2018-04-20 NOTE — PROGRESS NOTE ADULT - SUBJECTIVE AND OBJECTIVE BOX
POST ANESTHESIA EVALUATION    72y Male POSTOP DAY 1     MENTAL STATUS: Patient participation [ x ] Awake     [  ] Arousable     [  ] Sedated    AIRWAY PATENCY: [ x ] Satisfactory  [  ] Other:     Vital Signs Last 24 Hrs  T(C): 37.8 (20 Apr 2018 08:00), Max: 38 (20 Apr 2018 04:00)  T(F): 100.1 (20 Apr 2018 08:00), Max: 100.4 (20 Apr 2018 04:00)  HR: 81 (20 Apr 2018 10:00) (68 - 87)  BP: 139/74 (20 Apr 2018 10:00) (96/63 - 139/74)  BP(mean): 87 (20 Apr 2018 10:00) (67 - 95)  RR: 17 (20 Apr 2018 10:00) (14 - 23)  SpO2: 96% (20 Apr 2018 10:00) (93% - 98%)  I&O's Summary    19 Apr 2018 07:01  -  20 Apr 2018 07:00  --------------------------------------------------------  IN: 1500 mL / OUT: 685 mL / NET: 815 mL    20 Apr 2018 07:01  -  20 Apr 2018 10:40  --------------------------------------------------------  IN: 360 mL / OUT: 10 mL / NET: 350 mL          NAUSEA/ VOMITTING:  [ x ] NONE  [  ] CONTROLLED [  ] OTHER     PAIN: [ x ] CONTROLLED WITH CURRENT REGIMEN  [  ] OTHER    [ x ] NO APPARENT ANESTHESIA COMPLICATIONS      Comments:

## 2018-04-20 NOTE — PROGRESS NOTE ADULT - SUBJECTIVE AND OBJECTIVE BOX
Anesthesia Pain Management Service    SUBJECTIVE: Patient is doing well with IV PCA and no significant problems reported.    Pain Scale Score	At rest: __1_ 	With Activity: __2_ 	[X ] Refer to charted pain scores    THERAPY:    [ ] IV PCA Morphine		[ ] 5 mg/mL	[ ] 1 mg/mL  [X ] IV PCA Hydromorphone	[ ] 5 mg/mL	[X ] 1 mg/mL  [ ] IV PCA Fentanyl		[ ] 50 micrograms/mL    Demand dose __0.2_ lockout __6_ (minutes) Continuous Rate _0__ Total: __3.3mg_  Daily      MEDICATIONS  (STANDING):  dextrose 5%. 1000 milliLiter(s) (50 mL/Hr) IV Continuous <Continuous>  dextrose 50% Injectable 12.5 Gram(s) IV Push once  dextrose 50% Injectable 25 Gram(s) IV Push once  dextrose 50% Injectable 25 Gram(s) IV Push once  docusate sodium 100 milliGRAM(s) Oral three times a day  famotidine    Tablet 20 milliGRAM(s) Oral every 12 hours  heparin  Injectable 5000 Unit(s) SubCutaneous every 8 hours  HYDROmorphone PCA (1 mG/mL) 30 milliLiter(s) PCA Continuous PCA Continuous  insulin lispro (HumaLOG) corrective regimen sliding scale   SubCutaneous three times a day before meals  insulin lispro (HumaLOG) corrective regimen sliding scale   SubCutaneous at bedtime  lactated ringers. 1000 milliLiter(s) (30 mL/Hr) IV Continuous <Continuous>  senna 2 Tablet(s) Oral at bedtime  sodium chloride 0.9% lock flush 3 milliLiter(s) IV Push every 8 hours    MEDICATIONS  (PRN):  dextrose Gel 1 Dose(s) Oral once PRN Blood Glucose LESS THAN 70 milliGRAM(s)/deciliter  glucagon  Injectable 1 milliGRAM(s) IntraMuscular once PRN Glucose LESS THAN 70 milligrams/deciliter  HYDROmorphone PCA (1 mG/mL) Rescue Clinician Bolus 0.5 milliGRAM(s) IV Push every 15 minutes PRN for Pain Scale GREATER THAN 6  naloxone Injectable 0.1 milliGRAM(s) IV Push every 3 minutes PRN For ANY of the following changes in patient status:  A. RR LESS THAN 10 breaths per minute, B. Oxygen saturation LESS THAN 90%, C. Sedation score of 6  ondansetron Injectable 4 milliGRAM(s) IV Push every 6 hours PRN Nausea      OBJECTIVE:    Sedation Score:	[ X] Alert	[ ] Drowsy 	[ ] Arousable	[ ] Asleep	[ ] Unresponsive    Side Effects:	[X ] None	[ ] Nausea	[ ] Vomiting	[ ] Pruritus  		[ ] Other:    Vital Signs Last 24 Hrs  T(C): 37.8 (20 Apr 2018 08:00), Max: 38 (20 Apr 2018 04:00)  T(F): 100.1 (20 Apr 2018 08:00), Max: 100.4 (20 Apr 2018 04:00)  HR: 79 (20 Apr 2018 09:00) (68 - 87)  BP: 115/76 (20 Apr 2018 09:00) (96/63 - 133/85)  BP(mean): 85 (20 Apr 2018 09:00) (67 - 95)  RR: 18 (20 Apr 2018 09:00) (14 - 23)  SpO2: 95% (20 Apr 2018 09:00) (93% - 98%)    ASSESSMENT/ PLAN    Therapy to  be:	[ x] Continue   [ ] Discontinued   [ ] Change to prn Analgesics    Documentation and Verification of current medications:   [X] Done	[ ] Not done, not elligible    Comments:

## 2018-04-20 NOTE — PROGRESS NOTE ADULT - SUBJECTIVE AND OBJECTIVE BOX
GRETCHEN AQUINO  MRN-[id number]    HPI:  73yo male with medical h/o Pre-DM, HTN, pt reports during Cardiology evaluation - MRA of aorta, Lung mass noted. Pt presents today for presurgical testing for Bronchoscopy, Right Video Assisted Thoracoscopy, Lung Resection with Biopsy scheduled for 4/19/2018. (12 Apr 2018 17:48)      Procedure: VATS (video-assisted thoracoscopic surgery) , Lung resection  POD # : 1    Issues:  Lung mass  post op pain  HTN:         PAST MEDICAL & SURGICAL HISTORY:  Dilated aortic root  Prediabetes  Spinal stenosis  Hypertension  Pancreas cyst: removal  H/O laminectomy: 2017      ***VITAL SIGNS:  Vital Signs Last 24 Hrs  T(C): 37.6 (20 Apr 2018 00:00), Max: 37.6 (20 Apr 2018 00:00)  T(F): 99.6 (20 Apr 2018 00:00), Max: 99.6 (20 Apr 2018 00:00)  HR: 75 (20 Apr 2018 03:00) (65 - 84)  BP: 124/66 (20 Apr 2018 03:00) (99/62 - 144/84)  BP(mean): 77 (20 Apr 2018 03:00) (67 - 95)  RR: 20 (20 Apr 2018 03:00) (14 - 23)  SpO2: 96% (20 Apr 2018 03:00) (93% - 98%)  I/Os:   I&O's Detail    19 Apr 2018 07:01  -  20 Apr 2018 06:31  --------------------------------------------------------  IN:    lactated ringers.: 450 mL    Oral Fluid: 960 mL  Total IN: 1410 mL    OUT:    Chest Tube: 75 mL    Voided: 450 mL  Total OUT: 525 mL    Total NET: 885 mL        CAPILLARY BLOOD GLUCOSE      POCT Blood Glucose.: 126 mg/dL (20 Apr 2018 05:34)  POCT Blood Glucose.: 133 mg/dL (19 Apr 2018 22:31)  POCT Blood Glucose.: 145 mg/dL (19 Apr 2018 16:56)  POCT Blood Glucose.: 122 mg/dL (19 Apr 2018 10:30)      ======================= PHYSICAL EXAM============================  General:                         Awake, alert, not in any distress  Neuro:                            Moving all extremities to commands. No acute change from baseline exam.	  Respiratory:	Lungs clear to auscultation bilaterally. Good aeration.                                        chest tube to water seal  CV:		Regular rate and rhythm. Normal S1/S2. No murmurs                                          Distal pulses present.  Abdomen:	                     Soft, non-distended. Bowel sounds present  Skin:		No rash.  Extremities:	Warm, no cyanosis or edema.  Palpable pulses    ============================ LABS =========================                        14.5   8.97  )-----------( 241      ( 20 Apr 2018 05:30 )             45.2       =======================  MEDICATIONS  =================  MEDICATIONS  (STANDING):  dextrose 5%. 1000 milliLiter(s) (50 mL/Hr) IV Continuous <Continuous>  dextrose 50% Injectable 12.5 Gram(s) IV Push once  dextrose 50% Injectable 25 Gram(s) IV Push once  dextrose 50% Injectable 25 Gram(s) IV Push once  docusate sodium 100 milliGRAM(s) Oral three times a day  famotidine    Tablet 20 milliGRAM(s) Oral every 12 hours  heparin  Injectable 5000 Unit(s) SubCutaneous every 8 hours  HYDROmorphone PCA (1 mG/mL) 30 milliLiter(s) PCA Continuous PCA Continuous  insulin lispro (HumaLOG) corrective regimen sliding scale   SubCutaneous three times a day before meals  insulin lispro (HumaLOG) corrective regimen sliding scale   SubCutaneous at bedtime  lactated ringers. 1000 milliLiter(s) (30 mL/Hr) IV Continuous <Continuous>  senna 2 Tablet(s) Oral at bedtime  sodium chloride 0.9% lock flush 3 milliLiter(s) IV Push every 8 hours    MEDICATIONS  (PRN):  dextrose Gel 1 Dose(s) Oral once PRN Blood Glucose LESS THAN 70 milliGRAM(s)/deciliter  glucagon  Injectable 1 milliGRAM(s) IntraMuscular once PRN Glucose LESS THAN 70 milligrams/deciliter  HYDROmorphone PCA (1 mG/mL) Rescue Clinician Bolus 0.5 milliGRAM(s) IV Push every 15 minutes PRN for Pain Scale GREATER THAN 6  naloxone Injectable 0.1 milliGRAM(s) IV Push every 3 minutes PRN For ANY of the following changes in patient status:  A. RR LESS THAN 10 breaths per minute, B. Oxygen saturation LESS THAN 90%, C. Sedation score of 6  ondansetron Injectable 4 milliGRAM(s) IV Push every 6 hours PRN Nausea      ====================== NEUROLOGY=====================  Pain control with PCA/ Tylenol IV       ==================== RESPIRATORY======================  Pt is on  2   L nasal canula   Comfortable, not in any distress.  Using incentive spirometry   Monitor chest tube output  Chest tube to  water seal	  Continue bronchodilators, pulmonary toilet    ====================CARDIOVASCULAR==================  Continue hemodynamic monitoring.  Not on any pressors  Continue cardiovascular / antihypertensive medications    ===================== RENAL =========================  Continue  IVF  Monitor I/Os and electrolytes        ==================== GASTROINTESTINAL===================  On regular diet, tolerating  Continue GI prophylaxis with Protonix  Continue Zofran / Reglan for nausea - PRN	      =======================    ENDOCRIN  =====================  Glycemic monitoring  F/S with coverage  ===================HEMATOLOGIC/ONC ===================  Monitor chest tube output. No signs of active bleeding.   Follow CBC in AM  DVT prophylaxis with SCD, sc Heparin    ========================INFECTIOUS DISEASE================  No signs of infection. Monitor for fever / leukocytosis.  All surgical incision / chest tube  sites look clean        Pertinent clinical, laboratory, radiographic, hemodynamic, echocardiographic, respiratory data, microbiologic data and chart were reviewed and analyzed frequently throughout the course of the day and night. GI and DVT prophylaxis, glycemic control, head of bed elevation and skin care issues were addressed.  Patient seen, examined and plan discussed with CT Surgery / CTICU team during rounds.        Adelineupalva Israel DO, FACEP

## 2018-04-21 LAB
ALBUMIN SERPL ELPH-MCNC: 3.5 G/DL — SIGNIFICANT CHANGE UP (ref 3.3–5)
ALP SERPL-CCNC: 54 U/L — SIGNIFICANT CHANGE UP (ref 40–120)
ALT FLD-CCNC: 26 U/L — SIGNIFICANT CHANGE UP (ref 4–41)
APTT BLD: 30.4 SEC — SIGNIFICANT CHANGE UP (ref 27.5–37.4)
AST SERPL-CCNC: 23 U/L — SIGNIFICANT CHANGE UP (ref 4–40)
BILIRUB SERPL-MCNC: 1 MG/DL — SIGNIFICANT CHANGE UP (ref 0.2–1.2)
BUN SERPL-MCNC: 15 MG/DL — SIGNIFICANT CHANGE UP (ref 7–23)
CALCIUM SERPL-MCNC: 7.9 MG/DL — LOW (ref 8.4–10.5)
CHLORIDE SERPL-SCNC: 96 MMOL/L — LOW (ref 98–107)
CO2 SERPL-SCNC: 26 MMOL/L — SIGNIFICANT CHANGE UP (ref 22–31)
CREAT SERPL-MCNC: 0.89 MG/DL — SIGNIFICANT CHANGE UP (ref 0.5–1.3)
GLUCOSE BLDC GLUCOMTR-MCNC: 129 MG/DL — HIGH (ref 70–99)
GLUCOSE BLDC GLUCOMTR-MCNC: 130 MG/DL — HIGH (ref 70–99)
GLUCOSE BLDC GLUCOMTR-MCNC: 133 MG/DL — HIGH (ref 70–99)
GLUCOSE BLDC GLUCOMTR-MCNC: 137 MG/DL — HIGH (ref 70–99)
GLUCOSE SERPL-MCNC: 142 MG/DL — HIGH (ref 70–99)
HCT VFR BLD CALC: 43.7 % — SIGNIFICANT CHANGE UP (ref 39–50)
HGB BLD-MCNC: 13.9 G/DL — SIGNIFICANT CHANGE UP (ref 13–17)
INR BLD: 1.34 — HIGH (ref 0.88–1.17)
MAGNESIUM SERPL-MCNC: 2.6 MG/DL — SIGNIFICANT CHANGE UP (ref 1.6–2.6)
MCHC RBC-ENTMCNC: 27.9 PG — SIGNIFICANT CHANGE UP (ref 27–34)
MCHC RBC-ENTMCNC: 31.8 % — LOW (ref 32–36)
MCV RBC AUTO: 87.8 FL — SIGNIFICANT CHANGE UP (ref 80–100)
NRBC # FLD: 0 — SIGNIFICANT CHANGE UP
PHOSPHATE SERPL-MCNC: 2.3 MG/DL — LOW (ref 2.5–4.5)
PLATELET # BLD AUTO: 191 K/UL — SIGNIFICANT CHANGE UP (ref 150–400)
PMV BLD: 11.4 FL — SIGNIFICANT CHANGE UP (ref 7–13)
POTASSIUM SERPL-MCNC: 4.1 MMOL/L — SIGNIFICANT CHANGE UP (ref 3.5–5.3)
POTASSIUM SERPL-SCNC: 4.1 MMOL/L — SIGNIFICANT CHANGE UP (ref 3.5–5.3)
PROT SERPL-MCNC: 6.4 G/DL — SIGNIFICANT CHANGE UP (ref 6–8.3)
PROTHROM AB SERPL-ACNC: 15 SEC — HIGH (ref 9.8–13.1)
RBC # BLD: 4.98 M/UL — SIGNIFICANT CHANGE UP (ref 4.2–5.8)
RBC # FLD: 13 % — SIGNIFICANT CHANGE UP (ref 10.3–14.5)
SODIUM SERPL-SCNC: 133 MMOL/L — LOW (ref 135–145)
WBC # BLD: 7.96 K/UL — SIGNIFICANT CHANGE UP (ref 3.8–10.5)
WBC # FLD AUTO: 7.96 K/UL — SIGNIFICANT CHANGE UP (ref 3.8–10.5)

## 2018-04-21 PROCEDURE — 99233 SBSQ HOSP IP/OBS HIGH 50: CPT

## 2018-04-21 PROCEDURE — 71045 X-RAY EXAM CHEST 1 VIEW: CPT | Mod: 26

## 2018-04-21 RX ORDER — POTASSIUM CHLORIDE 20 MEQ
40 PACKET (EA) ORAL ONCE
Qty: 0 | Refills: 0 | Status: COMPLETED | OUTPATIENT
Start: 2018-04-21 | End: 2018-04-21

## 2018-04-21 RX ORDER — OXYCODONE HYDROCHLORIDE 5 MG/1
5 TABLET ORAL EVERY 4 HOURS
Qty: 0 | Refills: 0 | Status: DISCONTINUED | OUTPATIENT
Start: 2018-04-21 | End: 2018-04-22

## 2018-04-21 RX ORDER — SODIUM,POTASSIUM PHOSPHATES 278-250MG
1 POWDER IN PACKET (EA) ORAL THREE TIMES A DAY
Qty: 0 | Refills: 0 | Status: DISCONTINUED | OUTPATIENT
Start: 2018-04-21 | End: 2018-04-22

## 2018-04-21 RX ORDER — METOPROLOL TARTRATE 50 MG
12.5 TABLET ORAL
Qty: 0 | Refills: 0 | Status: DISCONTINUED | OUTPATIENT
Start: 2018-04-21 | End: 2018-04-22

## 2018-04-21 RX ORDER — METOPROLOL TARTRATE 50 MG
2.5 TABLET ORAL
Qty: 0 | Refills: 0 | Status: COMPLETED | OUTPATIENT
Start: 2018-04-21 | End: 2018-04-21

## 2018-04-21 RX ORDER — OXYCODONE HYDROCHLORIDE 5 MG/1
10 TABLET ORAL EVERY 4 HOURS
Qty: 0 | Refills: 0 | Status: DISCONTINUED | OUTPATIENT
Start: 2018-04-21 | End: 2018-04-22

## 2018-04-21 RX ORDER — ACETAMINOPHEN 500 MG
650 TABLET ORAL EVERY 4 HOURS
Qty: 0 | Refills: 0 | Status: DISCONTINUED | OUTPATIENT
Start: 2018-04-21 | End: 2018-04-22

## 2018-04-21 RX ADMIN — Medication 500 MICROGRAM(S): at 10:28

## 2018-04-21 RX ADMIN — Medication 12.5 MILLIGRAM(S): at 08:01

## 2018-04-21 RX ADMIN — Medication 500 MICROGRAM(S): at 04:27

## 2018-04-21 RX ADMIN — Medication 12.5 MILLIGRAM(S): at 17:46

## 2018-04-21 RX ADMIN — SODIUM CHLORIDE 3 MILLILITER(S): 9 INJECTION INTRAMUSCULAR; INTRAVENOUS; SUBCUTANEOUS at 14:34

## 2018-04-21 RX ADMIN — Medication 500 MICROGRAM(S): at 22:11

## 2018-04-21 RX ADMIN — SODIUM CHLORIDE 3 MILLILITER(S): 9 INJECTION INTRAMUSCULAR; INTRAVENOUS; SUBCUTANEOUS at 22:02

## 2018-04-21 RX ADMIN — Medication 400 MILLIGRAM(S): at 00:28

## 2018-04-21 RX ADMIN — Medication 2.5 MILLIGRAM(S): at 01:35

## 2018-04-21 RX ADMIN — HYDROMORPHONE HYDROCHLORIDE 30 MILLILITER(S): 2 INJECTION INTRAMUSCULAR; INTRAVENOUS; SUBCUTANEOUS at 07:13

## 2018-04-21 RX ADMIN — HEPARIN SODIUM 5000 UNIT(S): 5000 INJECTION INTRAVENOUS; SUBCUTANEOUS at 14:35

## 2018-04-21 RX ADMIN — Medication 40 MILLIEQUIVALENT(S): at 05:09

## 2018-04-21 RX ADMIN — HYDROMORPHONE HYDROCHLORIDE 30 MILLILITER(S): 2 INJECTION INTRAMUSCULAR; INTRAVENOUS; SUBCUTANEOUS at 16:11

## 2018-04-21 RX ADMIN — HEPARIN SODIUM 5000 UNIT(S): 5000 INJECTION INTRAVENOUS; SUBCUTANEOUS at 22:12

## 2018-04-21 RX ADMIN — Medication 500 MICROGRAM(S): at 17:44

## 2018-04-21 RX ADMIN — OXYCODONE HYDROCHLORIDE 5 MILLIGRAM(S): 5 TABLET ORAL at 19:27

## 2018-04-21 RX ADMIN — Medication 100 MILLIGRAM(S): at 05:09

## 2018-04-21 RX ADMIN — HEPARIN SODIUM 5000 UNIT(S): 5000 INJECTION INTRAVENOUS; SUBCUTANEOUS at 05:09

## 2018-04-21 RX ADMIN — Medication 2.5 MILLIGRAM(S): at 01:51

## 2018-04-21 RX ADMIN — Medication 100 MILLIGRAM(S): at 14:35

## 2018-04-21 RX ADMIN — FAMOTIDINE 20 MILLIGRAM(S): 10 INJECTION INTRAVENOUS at 17:46

## 2018-04-21 RX ADMIN — Medication 1 PACKET(S): at 08:01

## 2018-04-21 RX ADMIN — Medication 100 MILLIGRAM(S): at 22:12

## 2018-04-21 RX ADMIN — Medication 1 PACKET(S): at 14:35

## 2018-04-21 RX ADMIN — SENNA PLUS 2 TABLET(S): 8.6 TABLET ORAL at 22:12

## 2018-04-21 RX ADMIN — Medication 500 MICROGRAM(S): at 00:12

## 2018-04-21 RX ADMIN — OXYCODONE HYDROCHLORIDE 5 MILLIGRAM(S): 5 TABLET ORAL at 18:33

## 2018-04-21 RX ADMIN — SODIUM CHLORIDE 3 MILLILITER(S): 9 INJECTION INTRAMUSCULAR; INTRAVENOUS; SUBCUTANEOUS at 05:06

## 2018-04-21 RX ADMIN — FAMOTIDINE 20 MILLIGRAM(S): 10 INJECTION INTRAVENOUS at 05:09

## 2018-04-21 RX ADMIN — Medication 1 PACKET(S): at 22:12

## 2018-04-21 RX ADMIN — Medication 100 MILLIEQUIVALENT(S): at 00:54

## 2018-04-21 RX ADMIN — Medication 650 MILLIGRAM(S): at 20:25

## 2018-04-21 NOTE — PROGRESS NOTE ADULT - SUBJECTIVE AND OBJECTIVE BOX
Anesthesia Pain Management Service    SUBJECTIVE: Patient is doing well with IV PCA and no significant problems reported.    Pain Scale Score	At rest: ___ 	With Activity: ___ 	[X ] Refer to charted pain scores    THERAPY:    [ ] IV PCA Morphine		[ ] 5 mg/mL	[ ] 1 mg/mL  [X ] IV PCA Hydromorphone	[ ] 5 mg/mL	[X ] 1 mg/mL  [ ] IV PCA Fentanyl		[ ] 50 micrograms/mL    Demand dose __0.2_ lockout __6_ (minutes) Continuous Rate _0__ Total: _5.0__  Daily      MEDICATIONS  (STANDING):  dextrose 5%. 1000 milliLiter(s) (50 mL/Hr) IV Continuous <Continuous>  dextrose 50% Injectable 12.5 Gram(s) IV Push once  dextrose 50% Injectable 25 Gram(s) IV Push once  dextrose 50% Injectable 25 Gram(s) IV Push once  docusate sodium 100 milliGRAM(s) Oral three times a day  famotidine    Tablet 20 milliGRAM(s) Oral every 12 hours  heparin  Injectable 5000 Unit(s) SubCutaneous every 8 hours  HYDROmorphone PCA (1 mG/mL) 30 milliLiter(s) PCA Continuous PCA Continuous  insulin lispro (HumaLOG) corrective regimen sliding scale   SubCutaneous three times a day before meals  insulin lispro (HumaLOG) corrective regimen sliding scale   SubCutaneous at bedtime  ipratropium    for Nebulization 500 MICROGram(s) Nebulizer every 6 hours  lactated ringers. 1000 milliLiter(s) (30 mL/Hr) IV Continuous <Continuous>  metoprolol tartrate 12.5 milliGRAM(s) Oral two times a day  potassium phosphate / sodium phosphate powder 1 Packet(s) Oral three times a day  senna 2 Tablet(s) Oral at bedtime  sodium chloride 0.9% lock flush 3 milliLiter(s) IV Push every 8 hours    MEDICATIONS  (PRN):  dextrose Gel 1 Dose(s) Oral once PRN Blood Glucose LESS THAN 70 milliGRAM(s)/deciliter  glucagon  Injectable 1 milliGRAM(s) IntraMuscular once PRN Glucose LESS THAN 70 milligrams/deciliter  HYDROmorphone PCA (1 mG/mL) Rescue Clinician Bolus 0.5 milliGRAM(s) IV Push every 15 minutes PRN for Pain Scale GREATER THAN 6  naloxone Injectable 0.1 milliGRAM(s) IV Push every 3 minutes PRN For ANY of the following changes in patient status:  A. RR LESS THAN 10 breaths per minute, B. Oxygen saturation LESS THAN 90%, C. Sedation score of 6  ondansetron Injectable 4 milliGRAM(s) IV Push every 6 hours PRN Nausea      OBJECTIVE:    Sedation Score:	[ X] Alert	[ ] Drowsy 	[ ] Arousable	[ ] Asleep	[ ] Unresponsive    Side Effects:	[X ] None	[ ] Nausea	[ ] Vomiting	[ ] Pruritus  		[ ] Other:    Vital Signs Last 24 Hrs  T(C): 36.4 (21 Apr 2018 08:00), Max: 38.6 (20 Apr 2018 16:00)  T(F): 97.5 (21 Apr 2018 08:00), Max: 101.4 (20 Apr 2018 16:00)  HR: 85 (21 Apr 2018 09:00) (71 - 127)  BP: 113/64 (21 Apr 2018 09:00) (103/58 - 155/78)  BP(mean): 74 (21 Apr 2018 09:00) (69 - 98)  RR: 18 (21 Apr 2018 09:00) (11 - 22)  SpO2: 98% (21 Apr 2018 09:00) (93% - 100%)    ASSESSMENT/ PLAN    Therapy to  be:	[ X] Continue   [ ] Discontinued   [ ] Change to prn Analgesics    Documentation and Verification of current medications:   [X] Done	[ ] Not done, not elligible    Comments:

## 2018-04-21 NOTE — PROGRESS NOTE ADULT - SUBJECTIVE AND OBJECTIVE BOX
GRETCHEN AQUINO          MRN-[id number]    HPI:  73yo male with medical h/o Pre-DM, HTN, pt reports during Cardiology evaluation - MRA of aorta, Lung mass noted. Pt presents today for presurgical testing for Bronchoscopy, Right Video Assisted Thoracoscopy, Lung Resection with Biopsy scheduled for 4/19/2018. (12 Apr 2018 17:48)      Procedure:  POD # :     Issues:        Interval/Overnight Events/ ROS  Pt remained hemodynamically stable overnight, not on any pressors or inotropes. OOB to chair, breathing comfortably with minimal pain. Ambulated several times . Denies pain, no SOB, no palpitations, no nausea/ no vomiting, no dizziness  A-line and murillo d/sohail         PAST MEDICAL & SURGICAL HISTORY:  Dilated aortic root  Prediabetes  Spinal stenosis  Hypertension  Pancreas cyst: removal  H/O laminectomy: 2017    Allergies    adhesives (Rash)  Gluten (Flatulence; Stomach Upset)  No Known Drug Allergies    Intolerances            ***VITAL SIGNS:  Vital Signs Last 24 Hrs  T(C): 36.1 (21 Apr 2018 04:00), Max: 38.6 (20 Apr 2018 16:00)  T(F): 97 (21 Apr 2018 04:00), Max: 101.4 (20 Apr 2018 16:00)  HR: 104 (21 Apr 2018 07:00) (71 - 127)  BP: 117/80 (21 Apr 2018 07:00) (103/58 - 155/78)  BP(mean): 87 (21 Apr 2018 07:00) (69 - 98)  RR: 20 (21 Apr 2018 05:00) (11 - 22)  SpO2: 96% (21 Apr 2018 07:00) (93% - 100%)    I/Os:   I&O's Detail    20 Apr 2018 07:01  -  21 Apr 2018 07:00  --------------------------------------------------------  IN:    IV PiggyBack: 150 mL    lactated ringers.: 600 mL    Oral Fluid: 720 mL  Total IN: 1470 mL    OUT:    Chest Tube: 154 mL    Voided: 1830 mL  Total OUT: 1984 mL    Total NET: -514 mL          CAPILLARY BLOOD GLUCOSE      POCT Blood Glucose.: 105 mg/dL (20 Apr 2018 21:20)  POCT Blood Glucose.: 165 mg/dL (20 Apr 2018 17:01)  POCT Blood Glucose.: 117 mg/dL (20 Apr 2018 11:35)      =======================  MEDICATIONS  ===================  MEDICATIONS  (STANDING):  dextrose 5%. 1000 milliLiter(s) (50 mL/Hr) IV Continuous <Continuous>  dextrose 50% Injectable 12.5 Gram(s) IV Push once  dextrose 50% Injectable 25 Gram(s) IV Push once  dextrose 50% Injectable 25 Gram(s) IV Push once  docusate sodium 100 milliGRAM(s) Oral three times a day  famotidine    Tablet 20 milliGRAM(s) Oral every 12 hours  heparin  Injectable 5000 Unit(s) SubCutaneous every 8 hours  HYDROmorphone PCA (1 mG/mL) 30 milliLiter(s) PCA Continuous PCA Continuous  insulin lispro (HumaLOG) corrective regimen sliding scale   SubCutaneous three times a day before meals  insulin lispro (HumaLOG) corrective regimen sliding scale   SubCutaneous at bedtime  ipratropium    for Nebulization 500 MICROGram(s) Nebulizer every 6 hours  lactated ringers. 1000 milliLiter(s) (30 mL/Hr) IV Continuous <Continuous>  metoprolol tartrate 12.5 milliGRAM(s) Oral two times a day  potassium phosphate / sodium phosphate powder 1 Packet(s) Oral three times a day  senna 2 Tablet(s) Oral at bedtime  sodium chloride 0.9% lock flush 3 milliLiter(s) IV Push every 8 hours    MEDICATIONS  (PRN):  dextrose Gel 1 Dose(s) Oral once PRN Blood Glucose LESS THAN 70 milliGRAM(s)/deciliter  glucagon  Injectable 1 milliGRAM(s) IntraMuscular once PRN Glucose LESS THAN 70 milligrams/deciliter  HYDROmorphone PCA (1 mG/mL) Rescue Clinician Bolus 0.5 milliGRAM(s) IV Push every 15 minutes PRN for Pain Scale GREATER THAN 6  naloxone Injectable 0.1 milliGRAM(s) IV Push every 3 minutes PRN For ANY of the following changes in patient status:  A. RR LESS THAN 10 breaths per minute, B. Oxygen saturation LESS THAN 90%, C. Sedation score of 6  ondansetron Injectable 4 milliGRAM(s) IV Push every 6 hours PRN Nausea      ======================VENTILATOR SETTINGS  ==============      =================== PATIENT CARE ACCESS DEVICES ==========  Peripheral IV  Central Venous Line	R	L	IJ	Fem	SC			Placed:   Arterial Line	R	L	PT	DP	Fem	Rad	Ax	Placed:   Midline:				  Urinary Catheter, Date Placed:   Necessity of urinary, arterial, and venous catheters discussed    ======================= PHYSICAL EXAM===================  General:                         Comfortable, Awake, alert, not in any distress  Neuro:                            Moving all extremities to commands. No focal deficits	  HEENT:                           LUCIO/ ETT/ NGT/ trach  Respiratory:	Lungs clear on auscultation bilaterally with good aeration.                                           No rales, rhonchi, no wheezing. Effort even and unlabored.  CV:		Regular rate and rhythm. Normal S1/S2. No murmurs  Abdomen:	                     Soft,  nontender, not-distended. Bowel sounds present / absent.   Skin:		No rash.  Extremities:	Warm, no cyanosis or edema.  Palpable pulses    ============================ LABS =======================                        13.9   7.96  )-----------( 191      ( 21 Apr 2018 04:45 )             43.7     04-21    133<L>  |  96<L>  |  15  ----------------------------<  142<H>  4.1   |  26  |  0.89    Ca    7.9<L>      21 Apr 2018 04:45  Phos  2.3     04-21  Mg     2.6     04-21    TPro  6.4  /  Alb  3.5  /  TBili  1.0  /  DBili  x   /  AST  23  /  ALT  26  /  AlkPhos  54  04-21    LIVER FUNCTIONS - ( 21 Apr 2018 04:45 )  Alb: 3.5 g/dL / Pro: 6.4 g/dL / ALK PHOS: 54 u/L / ALT: 26 u/L / AST: 23 u/L / GGT: x           PT/INR - ( 21 Apr 2018 04:45 )   PT: 15.0 SEC;   INR: 1.34          PTT - ( 21 Apr 2018 04:45 )  PTT:30.4 SEC          ===================== IMAGING STUDIES ===================  Radiology personally reviewed.    ====================ASSESSMENT AND PLAN ================      ====================== NEUROLOGY=======================  Pain control with PCA / PCEA / Tylenol IV / Toradol / Percocet  Pt is on Precedex for agitation  Pt is sedated with Propofol / Fentanyl    ==================== RESPIRATORY========================  Pt is on            L nasal canula / Face tent____% FiO2  Comfortable, no evidence of distress.  Using incentive spirometry & doing                ml  Monitor chest tube output  Chest tube to suction / water seal	    Mechanical Ventilation:    Mechanical ventilator status assessed & settings reviewed  Continue bronchodilators, pulmonary toilet  Head of bed elevation to 30-40 degrees    ====================CARDIOVASCULAR=====================  Continue hemodynamic monitoring/ telemetry  Not on any pressors  Continue cardiovascular / antihypertensive medications    ===================== RENAL ============================  Continue LR 30CC/hr      D/C IVF  Monitor I/Os, BUN/ Cr  and electrolytes  D/C Murillo      Keep Murillo for UO monitoring  BPH: Continue Flomax/ Finasteride      ==================== GASTROINTESTINAL===================  On regular diet, tolerating well  Continue GI prophylaxis with Pepcid / Protonix  Continue Zofran / Reglan for nausea - PRN	  NPO    =======================    ENDOCRIN  =====================  Glycemic monitoring  F/S with coverage  ===================HEMATOLOGIC/ONCOLOGIC =============  Monitor chest tube output. No signs of active bleeding.   Follow CBC, coags  in AM  DVT prophylaxis with SCD, sc Heparin    ========================INFECTIOUS DISEASE===============  No signs of infection. Monitor for fever / leukocytosis.  All surgical incision / chest tube  sites look clean  D/C Murillo      Pertinent clinical, laboratory, radiographic, hemodynamic, echocardiographic, respiratory data, microbiologic data and chart were reviewed and analyzed frequently throughout the course of the day and night. GI and DVT prophylaxis, glycemic control, head of bed elevation and skin care issues were addressed.  Patient seen, examined and plan discussed with CT Surgery / CTICU team during rounds.  Pt remains critically ill in imminent risk of  deterioration and requires very careful cardio- pulmonary monitoring and support.    I have spent               minutes of critical care time with this pt between            am/pm    and               am/ pm         minutes spent on total encounter; more than 50% of the visit was spent counseling and/or coordinating care by the attending physician.        KAMI Cho MD GRETCHEN AQUINO          MRN-[id number]    HPI:  71yo male with medical h/o Pre-DM, HTN, pt reports during Cardiology evaluation - MRA of aorta, Lung mass noted. Pt presents today for presurgical testing for Bronchoscopy, Right Video Assisted Thoracoscopy, Lung Resection with Biopsy scheduled for 4/19/2018. (12 Apr 2018 17:48)      Procedure:4/19/18 R VATS - RUL lobectomy   POD # : 2    Issues: postop pain             right chest tube in place with air leak             new Afib overnight             anxiety        Interval/Overnight Events/ ROS  Pt remained hemodynamically stable overnight, not on any pressors or inotropes. OOB to chair, breathing comfortably with minimal pain. Ambulated several times . Denies pain, no SOB, no palpitations, no nausea/ no vomiting, no dizziness  A-line and murillo d/sohail         PAST MEDICAL & SURGICAL HISTORY:  Dilated aortic root  Prediabetes  Spinal stenosis  Hypertension  Pancreas cyst: removal  H/O laminectomy: 2017    Allergies    adhesives (Rash)  Gluten (Flatulence; Stomach Upset)  No Known Drug Allergies    Intolerances            ***VITAL SIGNS:  Vital Signs Last 24 Hrs  T(C): 36.1 (21 Apr 2018 04:00), Max: 38.6 (20 Apr 2018 16:00)  T(F): 97 (21 Apr 2018 04:00), Max: 101.4 (20 Apr 2018 16:00)  HR: 104 (21 Apr 2018 07:00) (71 - 127)  BP: 117/80 (21 Apr 2018 07:00) (103/58 - 155/78)  BP(mean): 87 (21 Apr 2018 07:00) (69 - 98)  RR: 20 (21 Apr 2018 05:00) (11 - 22)  SpO2: 96% (21 Apr 2018 07:00) (93% - 100%)    I/Os:   I&O's Detail    20 Apr 2018 07:01  -  21 Apr 2018 07:00  --------------------------------------------------------  IN:    IV PiggyBack: 150 mL    lactated ringers.: 600 mL    Oral Fluid: 720 mL  Total IN: 1470 mL    OUT:    Chest Tube: 154 mL    Voided: 1830 mL  Total OUT: 1984 mL    Total NET: -514 mL          CAPILLARY BLOOD GLUCOSE      POCT Blood Glucose.: 105 mg/dL (20 Apr 2018 21:20)  POCT Blood Glucose.: 165 mg/dL (20 Apr 2018 17:01)  POCT Blood Glucose.: 117 mg/dL (20 Apr 2018 11:35)      =======================  MEDICATIONS  ===================  MEDICATIONS  (STANDING):  dextrose 5%. 1000 milliLiter(s) (50 mL/Hr) IV Continuous <Continuous>  dextrose 50% Injectable 12.5 Gram(s) IV Push once  dextrose 50% Injectable 25 Gram(s) IV Push once  dextrose 50% Injectable 25 Gram(s) IV Push once  docusate sodium 100 milliGRAM(s) Oral three times a day  famotidine    Tablet 20 milliGRAM(s) Oral every 12 hours  heparin  Injectable 5000 Unit(s) SubCutaneous every 8 hours  HYDROmorphone PCA (1 mG/mL) 30 milliLiter(s) PCA Continuous PCA Continuous  insulin lispro (HumaLOG) corrective regimen sliding scale   SubCutaneous three times a day before meals  insulin lispro (HumaLOG) corrective regimen sliding scale   SubCutaneous at bedtime  ipratropium    for Nebulization 500 MICROGram(s) Nebulizer every 6 hours  lactated ringers. 1000 milliLiter(s) (30 mL/Hr) IV Continuous <Continuous>  metoprolol tartrate 12.5 milliGRAM(s) Oral two times a day  potassium phosphate / sodium phosphate powder 1 Packet(s) Oral three times a day  senna 2 Tablet(s) Oral at bedtime  sodium chloride 0.9% lock flush 3 milliLiter(s) IV Push every 8 hours    MEDICATIONS  (PRN):  dextrose Gel 1 Dose(s) Oral once PRN Blood Glucose LESS THAN 70 milliGRAM(s)/deciliter  glucagon  Injectable 1 milliGRAM(s) IntraMuscular once PRN Glucose LESS THAN 70 milligrams/deciliter  HYDROmorphone PCA (1 mG/mL) Rescue Clinician Bolus 0.5 milliGRAM(s) IV Push every 15 minutes PRN for Pain Scale GREATER THAN 6  naloxone Injectable 0.1 milliGRAM(s) IV Push every 3 minutes PRN For ANY of the following changes in patient status:  A. RR LESS THAN 10 breaths per minute, B. Oxygen saturation LESS THAN 90%, C. Sedation score of 6  ondansetron Injectable 4 milliGRAM(s) IV Push every 6 hours PRN Nausea      ======================VENTILATOR SETTINGS  ==============      =================== PATIENT CARE ACCESS DEVICES ==========  Peripheral IV  Central Venous Line	R	L	IJ	Fem	SC			Placed:   Arterial Line	R	L	PT	DP	Fem	Rad	Ax	Placed:   Midline:				  Urinary Catheter, Date Placed:   Necessity of urinary, arterial, and venous catheters discussed    ======================= PHYSICAL EXAM===================  General:                         Comfortable, Awake, alert, not in any distress  Neuro:                            Moving all extremities to commands. No focal deficits	  HEENT:                           LUCIO/ ETT/ NGT/ trach  Respiratory:	Lungs clear on auscultation bilaterally with good aeration.                                           No rales, rhonchi, no wheezing. Effort even and unlabored.  CV:		Regular rate and rhythm. Normal S1/S2. No murmurs  Abdomen:	                     Soft,  nontender, not-distended. Bowel sounds present / absent.   Skin:		No rash.  Extremities:	Warm, no cyanosis or edema.  Palpable pulses    ============================ LABS =======================                        13.9   7.96  )-----------( 191      ( 21 Apr 2018 04:45 )             43.7     04-21    133<L>  |  96<L>  |  15  ----------------------------<  142<H>  4.1   |  26  |  0.89    Ca    7.9<L>      21 Apr 2018 04:45  Phos  2.3     04-21  Mg     2.6     04-21    TPro  6.4  /  Alb  3.5  /  TBili  1.0  /  DBili  x   /  AST  23  /  ALT  26  /  AlkPhos  54  04-21    LIVER FUNCTIONS - ( 21 Apr 2018 04:45 )  Alb: 3.5 g/dL / Pro: 6.4 g/dL / ALK PHOS: 54 u/L / ALT: 26 u/L / AST: 23 u/L / GGT: x           PT/INR - ( 21 Apr 2018 04:45 )   PT: 15.0 SEC;   INR: 1.34          PTT - ( 21 Apr 2018 04:45 )  PTT:30.4 SEC          ===================== IMAGING STUDIES ===================  Radiology personally reviewed.    ====================ASSESSMENT AND PLAN ================      ====================== NEUROLOGY=======================  Pain control with PCA / PCEA / Tylenol IV / Toradol / Percocet  Pt is on Precedex for agitation  Pt is sedated with Propofol / Fentanyl    ==================== RESPIRATORY========================  Pt is on            L nasal canula / Face tent____% FiO2  Comfortable, no evidence of distress.  Using incentive spirometry & doing                ml  Monitor chest tube output  Chest tube to suction / water seal	    Mechanical Ventilation:    Mechanical ventilator status assessed & settings reviewed  Continue bronchodilators, pulmonary toilet  Head of bed elevation to 30-40 degrees    ====================CARDIOVASCULAR=====================  Continue hemodynamic monitoring/ telemetry  Not on any pressors  Continue cardiovascular / antihypertensive medications    ===================== RENAL ============================  Continue LR 30CC/hr      D/C IVF  Monitor I/Os, BUN/ Cr  and electrolytes  D/C Murillo      Keep Murillo for UO monitoring  BPH: Continue Flomax/ Finasteride      ==================== GASTROINTESTINAL===================  On regular diet, tolerating well  Continue GI prophylaxis with Pepcid / Protonix  Continue Zofran / Reglan for nausea - PRN	  NPO    =======================    ENDOCRIN  =====================  Glycemic monitoring  F/S with coverage  ===================HEMATOLOGIC/ONCOLOGIC =============  Monitor chest tube output. No signs of active bleeding.   Follow CBC, coags  in AM  DVT prophylaxis with SCD, sc Heparin    ========================INFECTIOUS DISEASE===============  No signs of infection. Monitor for fever / leukocytosis.  All surgical incision / chest tube  sites look clean  D/C Murillo      Pertinent clinical, laboratory, radiographic, hemodynamic, echocardiographic, respiratory data, microbiologic data and chart were reviewed and analyzed frequently throughout the course of the day and night. GI and DVT prophylaxis, glycemic control, head of bed elevation and skin care issues were addressed.  Patient seen, examined and plan discussed with CT Surgery / CTICU team during rounds.  Pt remains critically ill in imminent risk of  deterioration and requires very careful cardio- pulmonary monitoring and support.    I have spent               minutes of critical care time with this pt between            am/pm    and               am/ pm         minutes spent on total encounter; more than 50% of the visit was spent counseling and/or coordinating care by the attending physician.        KAMI Cho MD GRETCHEN AQUINO          MRN-[id number]    HPI:  73yo male with medical h/o Pre-DM, HTN, pt reports during Cardiology evaluation - MRA of aorta, Lung mass noted. Pt presents today for presurgical testing for Bronchoscopy, Right Video Assisted Thoracoscopy, Lung Resection with Biopsy scheduled for 4/19/2018. (12 Apr 2018 17:48)      Procedure:4/19/18 R VATS - RUL lobectomy   POD # : 2    Issues: postop pain             right chest tube in place with air leak             new Afib - rate controlled overnight- received K, Mg, Cardizem and Lopressor             anxiety             Hx HTN, pre DM,       Interval/Overnight Events/ ROS  Pt remained hemodynamically stable overnight, not on any pressors or inotropes. OOB to chair, breathing comfortably with minimal pain. Ambulated several times . Denies pain, no SOB, no palpitations, no nausea/ no vomiting, no dizziness  A-line and murillo d/sohail         PAST MEDICAL & SURGICAL HISTORY:  Dilated aortic root  Prediabetes  Spinal stenosis  Hypertension  Pancreas cyst: removal  H/O laminectomy: 2017    Home Medications:   * Patient Currently Takes Medications as of 12-Apr-2018 17:25 documented in Structured Notes  · 	losartan 100 mg oral tablet: 1 tab(s) orally once a day  · 	atenolol 50 mg oral tablet: 1 tab(s) orally once a day  · 	Calcium: 1200mg daily      Allergies  adhesives (Rash)  Gluten (Flatulence; Stomach Upset)  No Known Drug Allergies        ***VITAL SIGNS:  Vital Signs Last 24 Hrs  T(C): 36.1 (21 Apr 2018 04:00), Max: 38.6 (20 Apr 2018 16:00)  T(F): 97 (21 Apr 2018 04:00), Max: 101.4 (20 Apr 2018 16:00)  HR: 104 (21 Apr 2018 07:00) (71 - 127)  BP: 117/80 (21 Apr 2018 07:00) (103/58 - 155/78)  BP(mean): 87 (21 Apr 2018 07:00) (69 - 98)  RR: 20 (21 Apr 2018 05:00) (11 - 22)  SpO2: 96% (21 Apr 2018 07:00) (93% - 100%)    I/Os:   I&O's Detail    20 Apr 2018 07:01  -  21 Apr 2018 07:00  --------------------------------------------------------  IN:    IV PiggyBack: 150 mL    lactated ringers.: 600 mL    Oral Fluid: 720 mL  Total IN: 1470 mL    OUT:    Chest Tube: 154 mL    Voided: 1830 mL  Total OUT: 1984 mL    Total NET: -514 ml    CAPILLARY BLOOD GLUCOSE      POCT Blood Glucose.: 105 mg/dL (20 Apr 2018 21:20)  POCT Blood Glucose.: 165 mg/dL (20 Apr 2018 17:01)  POCT Blood Glucose.: 117 mg/dL (20 Apr 2018 11:35)      =======================  MEDICATIONS  ===================  MEDICATIONS  (STANDING):  dextrose 5%. 1000 milliLiter(s) (50 mL/Hr) IV Continuous   dextrose 50% Injectable 12.5 Gram(s) IV Push once  dextrose 50% Injectable 25 Gram(s) IV Push once  dextrose 50% Injectable 25 Gram(s) IV Push once  docusate sodium 100 milliGRAM(s) Oral three times a day  famotidine    Tablet 20 milliGRAM(s) Oral every 12 hours  heparin  Injectable 5000 Unit(s) SubCutaneous every 8 hours  HYDROmorphone PCA (1 mG/mL) 30 milliLiter(s) PCA Continuous PCA Continuous  insulin lispro (HumaLOG) corrective regimen sliding scale   SubCutaneous three times a day before meals  insulin lispro (HumaLOG) corrective regimen sliding scale   SubCutaneous at bedtime  ipratropium    for Nebulization 500 MICROGram(s) Nebulizer every 6 hours  lactated ringers. 1000 milliLiter(s) (30 mL/Hr) IV Continuous <Continuous>  metoprolol tartrate 12.5 milliGRAM(s) Oral two times a day  potassium phosphate / sodium phosphate powder 1 Packet(s) Oral three times a day  senna 2 Tablet(s) Oral at bedtime  sodium chloride 0.9% lock flush 3 milliLiter(s) IV Push every 8 hours    MEDICATIONS  (PRN):  dextrose Gel 1 Dose(s) Oral once PRN Blood Glucose LESS THAN 70 milliGRAM(s)/deciliter  glucagon  Injectable 1 milliGRAM(s) IntraMuscular once PRN Glucose LESS THAN 70 milligrams/deciliter  HYDROmorphone PCA (1 mG/mL) Rescue Clinician Bolus 0.5 milliGRAM(s) IV Push every 15 minutes PRN for Pain Scale GREATER THAN 6  naloxone Injectable 0.1 milliGRAM(s) IV Push every 3 minutes PRN For ANY of the following changes in patient status:  A. RR LESS THAN 10 breaths per minute, B. Oxygen saturation LESS THAN 90%, C. Sedation score of 6  ondansetron Injectable 4 milliGRAM(s) IV Push every 6 hours PRN Nausea    =================== PATIENT CARE ACCESS DEVICES ==========  Peripheral IV (+)       ======================= PHYSICAL EXAM===================  General:            Comfortable, Awake, alert, not in any distress  Neuro:              Moving all extremities to commands. No focal deficits	  HEENT:                           LUCIO  Respiratory:	Lungs clear on auscultation bilaterally with good aeration.                           No rales, rhonchi, no wheezing. Effort even and unlabored.                          Right chest tube site - clean  CV:		Irregular rate and rhythm. (+) S1/S2.   Abdomen:         Soft,  nontender, not-distended. Bowel sounds present   Skin:		No rash.  Extremities:	Warm, no cyanosis or edema.  Palpable pulses    ============================ LABS =======================                        13.9   7.96  )-----------( 191      ( 21 Apr 2018 04:45 )             43.7     04-21    133<L>  |  96<L>  |  15  ----------------------------<  142<H>  4.1   |  26  |  0.89    Ca    7.9<L>      21 Apr 2018 04:45  Phos  2.3     04-21  Mg     2.6     04-21    TPro  6.4  /  Alb  3.5  /  TBili  1.0  /  DBili  x   /  AST  23  /  ALT  26  /  AlkPhos  54  04-21    LIVER FUNCTIONS - ( 21 Apr 2018 04:45 )  Alb: 3.5 g/dL / Pro: 6.4 g/dL / ALK PHOS: 54 u/L / ALT: 26 u/L / AST: 23 u/L / GGT: x           PT/INR - ( 21 Apr 2018 04:45 )   PT: 15.0 SEC;   INR: 1.34     PTT:30.4 SEC      ===================== IMAGING STUDIES ===================  Radiology personally reviewed.  < from: Xray Chest 1 View- PORTABLE-Routine (04.20.18 @ 07:37) >  INTERPRETATION:  TIME OF EXAM: April 20, 2018 at 7:13 AM    CLINICAL INFORMATION: Post right lung resection    TECHNIQUE:   Portable chest    INTERPRETATION:     Right-sided chest tube again seen in place. Residual small anterior   pneumothorax identified with loss of volume in this lung on this side.   Left lung is clear. Heart size is stable.      COMPARISON:  April 19      IMPRESSION:  Follow-up right thoracotomywith chest tube and pneumothorax.    < end of copied text >    ====================ASSESSMENT AND PLAN ================  73yo male with medical h/o Pre-DM, HTN, pt reports during Cardiology evaluation - MRA of aorta, Lung mass noted. Pt admitted  for Bronchoscopy, Right Video Assisted Thoracoscopy, Lung Resection with Biopsy.    Procedure: 4/19/18 R VATS - RUL lobectomy        Issues: postop pain             right chest tube in place with air leak             new Afib - rate controlled overnight- received K, Mg, Cardizem and Lopressor             anxiety             Hx HTN, pre DM,     ====================== NEUROLOGY=======================  Pain control with PCA /  Tylenol IV     ==================== RESPIRATORY========================  Pt is on       2     L nasal canula  Comfortable, no evidence of distress.  Using incentive spirometry & doing   1500     ml  Monitor chest tube output and intermittent air leak  Chest tube to  water seal	  Continue bronchodilators, pulmonary toilet  Head of bed elevation to 30-40 degrees    ====================CARDIOVASCULAR=====================  Continue hemodynamic monitoring/ telemetry  Not on any pressors  Continue cardiovascular / antihypertensive medications  metoprolol tartrate 12.5 milliGRAM(s) Oral two times a day  If Afib not converting with lytes repletion and lopressor  - will need Amio this AM    ===================== RENAL ============================  Continue LR 30CC/hr       Monitor I/Os, BUN/ Cr  and electrolytes    ==================== GASTROINTESTINAL===================  On DASH DM diet, tolerating well  Continue GI prophylaxis with Protonix  Zofran / Reglan for nausea - PRN	    =======================    ENDOCRIN  =====================  Glycemic monitoring  F/S with coverage  ===================HEMATOLOGIC/ONCOLOGIC =============  Monitor chest tube output. No signs of active bleeding.   Follow CBC, coags  in AM  DVT prophylaxis with SCD, sc Heparin    ========================INFECTIOUS DISEASE===============  No signs of infection. Monitor for fever / leukocytosis.  All surgical incision / chest tube  sites look clean        Pertinent clinical, laboratory, radiographic, hemodynamic, echocardiographic, respiratory data, microbiologic data and chart were reviewed and analyzed frequently throughout the course of the day and night. GI and DVT prophylaxis, glycemic control, head of bed elevation and skin care issues were addressed.  Patient seen, examined and plan discussed with CT Surgery / CTICU team during rounds.  Pt remains critically ill in imminent risk of  deterioration and requires very careful cardio- pulmonary monitoring and support.    I have spent     35 minutes of critical care time with this pt between  12 am    and  8  am         minutes spent on total encounter; more than 50% of the visit was spent counseling and/or coordinating care by the attending physician.        KAMI Cho MD

## 2018-04-22 ENCOUNTER — TRANSCRIPTION ENCOUNTER (OUTPATIENT)
Age: 73
End: 2018-04-22

## 2018-04-22 VITALS — OXYGEN SATURATION: 97 %

## 2018-04-22 LAB — GLUCOSE BLDC GLUCOMTR-MCNC: 138 MG/DL — HIGH (ref 70–99)

## 2018-04-22 PROCEDURE — 99238 HOSP IP/OBS DSCHRG MGMT 30/<: CPT

## 2018-04-22 RX ORDER — ACETAMINOPHEN 500 MG
2 TABLET ORAL
Qty: 0 | Refills: 0 | COMMUNITY

## 2018-04-22 RX ORDER — LOSARTAN POTASSIUM 100 MG/1
100 TABLET, FILM COATED ORAL DAILY
Qty: 0 | Refills: 0 | Status: DISCONTINUED | OUTPATIENT
Start: 2018-04-22 | End: 2018-04-22

## 2018-04-22 RX ORDER — ATENOLOL 25 MG/1
50 TABLET ORAL DAILY
Qty: 0 | Refills: 0 | Status: DISCONTINUED | OUTPATIENT
Start: 2018-04-22 | End: 2018-04-22

## 2018-04-22 RX ORDER — ACETAMINOPHEN 500 MG
650 TABLET ORAL EVERY 6 HOURS
Qty: 0 | Refills: 0 | Status: DISCONTINUED | OUTPATIENT
Start: 2018-04-22 | End: 2018-04-22

## 2018-04-22 RX ORDER — SENNA PLUS 8.6 MG/1
2 TABLET ORAL
Qty: 0 | Refills: 0 | COMMUNITY
Start: 2018-04-22

## 2018-04-22 RX ORDER — DOCUSATE SODIUM 100 MG
1 CAPSULE ORAL
Qty: 0 | Refills: 0 | COMMUNITY
Start: 2018-04-22

## 2018-04-22 RX ADMIN — Medication 500 MICROGRAM(S): at 03:40

## 2018-04-22 RX ADMIN — Medication 650 MILLIGRAM(S): at 08:00

## 2018-04-22 RX ADMIN — LOSARTAN POTASSIUM 100 MILLIGRAM(S): 100 TABLET, FILM COATED ORAL at 01:26

## 2018-04-22 RX ADMIN — ATENOLOL 50 MILLIGRAM(S): 25 TABLET ORAL at 01:26

## 2018-04-22 RX ADMIN — SODIUM CHLORIDE 3 MILLILITER(S): 9 INJECTION INTRAMUSCULAR; INTRAVENOUS; SUBCUTANEOUS at 05:21

## 2018-04-22 RX ADMIN — Medication 650 MILLIGRAM(S): at 07:19

## 2018-04-22 RX ADMIN — Medication 500 MICROGRAM(S): at 10:22

## 2018-04-22 NOTE — DISCHARGE NOTE ADULT - INSTRUCTIONS
Resume usual diet Right midaxillary chest tube site remain dressing in place, may fell off after shower, leave it open to air. Follow up with doctor Rios, prior to your appointment get chest xray done and take it to dr. Rios office. IF you get fever greater than 100.4, or any pussy drainage on surgical site go to nearest emergency. MAy walk indoor or outdoor.

## 2018-04-22 NOTE — DISCHARGE NOTE ADULT - ADDITIONAL INSTRUCTIONS
See Dr Rios in 2 weeks; Call for an appointment.  Bring a new chest X-ray with you.  Take the chest tube dressings off after two days and keep the area clean with soap and water an s then dry off. See Dr Rios in 2 weeks; Call for an appointment.  Bring a new chest X-ray with you.  Take the chest tube dressings off on Monday and keep the area clean with soap and water and then dry off. Apply bandaid to suture site as needed. Please have a chest xray done 1-2 days prior to your appointment, and bring a copy with you to Dr Rios's office.

## 2018-04-22 NOTE — DISCHARGE NOTE ADULT - CARE PROVIDER_API CALL
Jackson De La Cruz (DEREK), Gastroenterology; Internal Medicine  [address] [facility]  [address] [place] [address]  Phone: [phone]  Fax: [phone]    Seven Rios), Surgery; Thoracic Surgery  [address] [place] [address]  Phone: [phone]  Fax: [phone]

## 2018-04-22 NOTE — DISCHARGE NOTE ADULT - MEDICATION SUMMARY - MEDICATIONS TO TAKE
I will START or STAY ON the medications listed below when I get home from the hospital:    Calcium  -- 1200mg daily  -- Indication: For Supplement    Percocet 5/325 oral tablet  -- 1 tab(s) by mouth every 4 hours, As Needed -for moderate pain MDD:6   -- Caution federal law prohibits the transfer of this drug to any person other  than the person for whom it was prescribed.  May cause drowsiness.  Alcohol may intensify this effect.  Use care when operating dangerous machinery.  This prescription cannot be refilled.  This product contains acetaminophen.  Do not use  with any other product containing acetaminophen to prevent possible liver damage.  Using more of this medication than prescribed may cause serious breathing problems.    -- Indication: For moderate pain    Tylenol 325 mg oral capsule  -- 2 tab(s) by mouth every 4 hours, As Needed for mild pain  -- Indication: For mild pain    losartan 100 mg oral tablet  -- 1 tab(s) by mouth once a day  -- Indication: For blood pressure    atenolol 50 mg oral tablet  -- 1 tab(s) by mouth once a day  -- Indication: For Heart rate and blood pressure    senna oral tablet  -- 2 tab(s) by mouth once a day (at bedtime)  -- Indication: For laxative    docusate sodium 100 mg oral capsule  -- 1 cap(s) by mouth 3 times a day  -- Indication: For Stool softener

## 2018-04-22 NOTE — DISCHARGE NOTE ADULT - HOSPITAL COURSE
73 yo male with medical had an MRA of aorta and a right lung mass was discovered. Pt underwent a Bronchoscopy, Right Video Assisted Thoracoscopy, Right Upper Lobe wedge resection on 4/19/2018.  Post-op  he had episodes of a-fib until he was back on his pre-op blood pressure meds.  He was discharged home after his tubes were removed and his chest X-rays were stable. 71 yo male with medical had an MRA of aorta and a right lung mass was discovered. Pt underwent a Bronchoscopy, Right Video Assisted Thoracoscopy, Right Upper Lobe wedge resection on 4/19/2018.  Post-op  he had episodes of a-fib until he was back on his pre-op blood pressure meds.  Pt refused repeat CXR or EKG while in afib. He was discharged home after his tubes were removed and his chest X-rays were stable. He is in SR.  Vital Signs Last 24 Hrs  T(C): 36.9 (22 Apr 2018 07:18), Max: 38.2 (21 Apr 2018 20:00)  T(F): 98.4 (22 Apr 2018 07:18), Max: 100.8 (21 Apr 2018 20:00)  HR: 76 (22 Apr 2018 10:22) (74 - 193)  BP: 118/69 (22 Apr 2018 07:18) (112/69 - 140/90)  BP(mean): 73 (21 Apr 2018 15:00) (73 - 80)  RR: 18 (22 Apr 2018 07:18) (16 - 20)  SpO2: 97% (22 Apr 2018 10:22) (92% - 99%)

## 2018-04-22 NOTE — DISCHARGE NOTE ADULT - PATIENT PORTAL LINK FT
You can access the [facility] Portal, offered by [facility], by registering with the following website: [email or web address]

## 2018-04-22 NOTE — DISCHARGE NOTE ADULT - CARE PLAN
Principal Discharge DX:	Solitary pulmonary nodule  Goal:	Wound healing; Further treatment plan development upon result of final pathology  Assessment and plan of treatment:	Stable; Follow up with Dr Rios

## 2018-04-22 NOTE — DISCHARGE NOTE ADULT - NS AS ACTIVITY OBS
Showering allowed/Do not drive or operate machinery/Walking-Indoors allowed/Walking-Outdoors allowed/Stairs allowed/No Heavy lifting/straining

## 2018-04-22 NOTE — DISCHARGE NOTE ADULT - PLAN OF CARE
Wound healing; Further treatment plan development upon result of final pathology Stable; Follow up with Dr Rios

## 2018-04-27 LAB — SURGICAL PATHOLOGY STUDY: SIGNIFICANT CHANGE UP

## 2018-05-03 ENCOUNTER — OUTPATIENT (OUTPATIENT)
Dept: OUTPATIENT SERVICES | Facility: HOSPITAL | Age: 73
LOS: 1 days | End: 2018-05-03
Payer: COMMERCIAL

## 2018-05-03 ENCOUNTER — APPOINTMENT (OUTPATIENT)
Dept: THORACIC SURGERY | Facility: CLINIC | Age: 73
End: 2018-05-03
Payer: COMMERCIAL

## 2018-05-03 ENCOUNTER — APPOINTMENT (OUTPATIENT)
Dept: RADIOLOGY | Facility: HOSPITAL | Age: 73
End: 2018-05-03

## 2018-05-03 VITALS
SYSTOLIC BLOOD PRESSURE: 140 MMHG | RESPIRATION RATE: 16 BRPM | DIASTOLIC BLOOD PRESSURE: 82 MMHG | OXYGEN SATURATION: 97 % | WEIGHT: 182 LBS | BODY MASS INDEX: 25.38 KG/M2 | HEART RATE: 77 BPM

## 2018-05-03 DIAGNOSIS — R76.11 NONSPECIFIC REACTION TO TUBERCULIN SKIN TEST WITHOUT ACTIVE TUBERCULOSIS: ICD-10-CM

## 2018-05-03 DIAGNOSIS — R91.1 SOLITARY PULMONARY NODULE: ICD-10-CM

## 2018-05-03 DIAGNOSIS — K86.2 CYST OF PANCREAS: Chronic | ICD-10-CM

## 2018-05-03 DIAGNOSIS — Z98.890 OTHER SPECIFIED POSTPROCEDURAL STATES: Chronic | ICD-10-CM

## 2018-05-03 PROCEDURE — 71046 X-RAY EXAM CHEST 2 VIEWS: CPT | Mod: 26

## 2018-05-03 PROCEDURE — 99024 POSTOP FOLLOW-UP VISIT: CPT

## 2018-05-16 ENCOUNTER — APPOINTMENT (OUTPATIENT)
Dept: THORACIC SURGERY | Facility: HOSPITAL | Age: 73
End: 2018-05-16

## 2018-05-16 VITALS
HEIGHT: 71 IN | BODY MASS INDEX: 25.48 KG/M2 | OXYGEN SATURATION: 95 % | SYSTOLIC BLOOD PRESSURE: 122 MMHG | RESPIRATION RATE: 17 BRPM | HEART RATE: 64 BPM | DIASTOLIC BLOOD PRESSURE: 80 MMHG | WEIGHT: 182 LBS

## 2018-06-05 ENCOUNTER — RESULT REVIEW (OUTPATIENT)
Age: 73
End: 2018-06-05

## 2018-07-31 ENCOUNTER — APPOINTMENT (OUTPATIENT)
Dept: THORACIC SURGERY | Facility: CLINIC | Age: 73
End: 2018-07-31
Payer: COMMERCIAL

## 2018-07-31 VITALS
OXYGEN SATURATION: 96 % | HEART RATE: 67 BPM | HEIGHT: 71 IN | RESPIRATION RATE: 17 BRPM | BODY MASS INDEX: 27.02 KG/M2 | TEMPERATURE: 98.6 F | DIASTOLIC BLOOD PRESSURE: 93 MMHG | SYSTOLIC BLOOD PRESSURE: 154 MMHG | WEIGHT: 193 LBS

## 2018-07-31 PROBLEM — R73.03 PREDIABETES: Chronic | Status: ACTIVE | Noted: 2018-04-12

## 2018-07-31 PROBLEM — M48.00 SPINAL STENOSIS, SITE UNSPECIFIED: Chronic | Status: ACTIVE | Noted: 2018-04-12

## 2018-07-31 PROBLEM — I77.810 THORACIC AORTIC ECTASIA: Chronic | Status: ACTIVE | Noted: 2018-04-12

## 2018-07-31 PROBLEM — I10 ESSENTIAL (PRIMARY) HYPERTENSION: Chronic | Status: ACTIVE | Noted: 2018-04-12

## 2018-07-31 PROCEDURE — 99214 OFFICE O/P EST MOD 30 MIN: CPT

## 2018-07-31 RX ORDER — ATENOLOL 50 MG/1
50 TABLET ORAL DAILY
Refills: 0 | Status: DISCONTINUED | COMMUNITY
End: 2018-07-31

## 2018-07-31 RX ORDER — LOSARTAN POTASSIUM 100 MG/1
100 TABLET, FILM COATED ORAL DAILY
Qty: 30 | Refills: 5 | Status: DISCONTINUED | COMMUNITY
Start: 2017-12-11 | End: 2018-07-31

## 2018-08-01 ENCOUNTER — APPOINTMENT (OUTPATIENT)
Dept: CARDIOTHORACIC SURGERY | Facility: CLINIC | Age: 73
End: 2018-08-01
Payer: COMMERCIAL

## 2018-08-01 VITALS
DIASTOLIC BLOOD PRESSURE: 87 MMHG | TEMPERATURE: 98.7 F | HEART RATE: 69 BPM | SYSTOLIC BLOOD PRESSURE: 132 MMHG | BODY MASS INDEX: 26.32 KG/M2 | WEIGHT: 188 LBS | HEIGHT: 71 IN | OXYGEN SATURATION: 96 % | RESPIRATION RATE: 16 BRPM

## 2018-08-01 VITALS — SYSTOLIC BLOOD PRESSURE: 130 MMHG | DIASTOLIC BLOOD PRESSURE: 83 MMHG

## 2018-08-01 PROCEDURE — 99245 OFF/OP CONSLTJ NEW/EST HI 55: CPT

## 2018-10-09 ENCOUNTER — APPOINTMENT (OUTPATIENT)
Dept: THORACIC SURGERY | Facility: CLINIC | Age: 73
End: 2018-10-09
Payer: COMMERCIAL

## 2018-10-09 VITALS
SYSTOLIC BLOOD PRESSURE: 129 MMHG | RESPIRATION RATE: 17 BRPM | HEIGHT: 71 IN | TEMPERATURE: 98.5 F | OXYGEN SATURATION: 97 % | BODY MASS INDEX: 26.32 KG/M2 | HEART RATE: 66 BPM | WEIGHT: 188 LBS | DIASTOLIC BLOOD PRESSURE: 81 MMHG

## 2018-10-09 PROCEDURE — 99213 OFFICE O/P EST LOW 20 MIN: CPT

## 2018-10-09 RX ORDER — PREDNISONE 10 MG/1
10 TABLET ORAL
Refills: 0 | Status: DISCONTINUED | COMMUNITY
End: 2018-10-09

## 2018-10-09 RX ORDER — CEFUROXIME AXETIL 250 MG/1
250 TABLET ORAL
Refills: 0 | Status: DISCONTINUED | COMMUNITY
End: 2018-10-09

## 2018-12-18 ENCOUNTER — APPOINTMENT (OUTPATIENT)
Dept: THORACIC SURGERY | Facility: CLINIC | Age: 73
End: 2018-12-18
Payer: MEDICARE

## 2018-12-18 VITALS
SYSTOLIC BLOOD PRESSURE: 120 MMHG | DIASTOLIC BLOOD PRESSURE: 64 MMHG | WEIGHT: 187 LBS | BODY MASS INDEX: 26.18 KG/M2 | HEIGHT: 71 IN | RESPIRATION RATE: 16 BRPM | HEART RATE: 78 BPM | OXYGEN SATURATION: 99 %

## 2018-12-18 PROCEDURE — 99213 OFFICE O/P EST LOW 20 MIN: CPT

## 2019-03-26 ENCOUNTER — APPOINTMENT (OUTPATIENT)
Dept: THORACIC SURGERY | Facility: CLINIC | Age: 74
End: 2019-03-26
Payer: MEDICARE

## 2019-03-26 VITALS
BODY MASS INDEX: 26.18 KG/M2 | HEIGHT: 71 IN | OXYGEN SATURATION: 96 % | RESPIRATION RATE: 16 BRPM | HEART RATE: 63 BPM | TEMPERATURE: 97.7 F | WEIGHT: 187 LBS | DIASTOLIC BLOOD PRESSURE: 84 MMHG | SYSTOLIC BLOOD PRESSURE: 141 MMHG

## 2019-03-26 PROCEDURE — 99214 OFFICE O/P EST MOD 30 MIN: CPT

## 2019-03-26 NOTE — PHYSICAL EXAM
[Sclera] : the sclera and conjunctiva were normal [Neck Appearance] : the appearance of the neck was normal [] : the neck was supple [Respiration, Rhythm And Depth] : normal respiratory rhythm and effort [Auscultation Breath Sounds / Voice Sounds] : lungs were clear to auscultation bilaterally [Heart Rate And Rhythm] : heart rate was normal and rhythm regular [Heart Sounds] : normal S1 and S2 [Examination Of The Chest] : the chest was normal in appearance [Abdomen Soft] : soft [Abdomen Tenderness] : non-tender [No CVA Tenderness] : no ~M costovertebral angle tenderness [Abnormal Walk] : normal gait [Skin Color & Pigmentation] : normal skin color and pigmentation [No Focal Deficits] : no focal deficits [Oriented To Time, Place, And Person] : oriented to person, place, and time [Impaired Insight] : insight and judgment were intact [Affect] : the affect was normal

## 2019-03-28 ENCOUNTER — APPOINTMENT (OUTPATIENT)
Age: 74
End: 2019-03-28
Payer: MEDICARE

## 2019-03-28 VITALS
SYSTOLIC BLOOD PRESSURE: 138 MMHG | RESPIRATION RATE: 14 BRPM | HEIGHT: 71 IN | TEMPERATURE: 98.4 F | DIASTOLIC BLOOD PRESSURE: 80 MMHG | BODY MASS INDEX: 27.16 KG/M2 | HEART RATE: 73 BPM | WEIGHT: 194 LBS | OXYGEN SATURATION: 94 %

## 2019-03-28 PROCEDURE — 99214 OFFICE O/P EST MOD 30 MIN: CPT

## 2019-03-28 RX ORDER — MULTIVITAMIN
TABLET ORAL DAILY
Refills: 0 | Status: ACTIVE | COMMUNITY

## 2019-03-28 RX ORDER — FLUTICASONE FUROATE AND VILANTEROL TRIFENATATE 100; 25 UG/1; UG/1
100-25 POWDER RESPIRATORY (INHALATION) DAILY
Refills: 0 | Status: COMPLETED | COMMUNITY
End: 2019-03-28

## 2019-04-01 NOTE — ASSESSMENT
[FreeTextEntry1] : 73 year old male s/p FB, Right VATS, RUL Lobectomy on 4/19/18. 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 CK 7 and TTF-1 positive and negative for CK20. EVG satin highlights intact elastic visceral pleura.\par \par CT Chest on 3/18/19 reveals:\par - Stable right hemithorax postsurgical changes\par - LLL 3 mm groundglass density nodule, stable\par - RENAE nodular density, 2-3 mm, stable\par - RENAE punctate nodular density just anterior to major fissure, stable\par - Stable tiny pretracheal retrovascular node\par - Stable ascending aortic aneurysm, 4.4 cm\par \par I have reviewed the patient's medical records and diagnostic images during the time of this office visit, and I have made the following recommendation:\par 1.  Follow up in 3 months with CXR.\par \par Written by Kimberly Beach NP, acting as a scribe for BRITTNI ROSE MD.\par \par The documentation recorded by the scribe accurately reflects the service I personally performed and the decisions made by me. BRITTNI ROSE MD\par  \par 2.

## 2019-04-01 NOTE — REVIEW OF SYSTEMS
[SOB on Exertion] : shortness of breath during exertion [Arthralgias] : arthralgias [Joint Pain] : joint pain [Joint Swelling] : joint swelling [Negative] : Heme/Lymph [Shortness Of Breath] : no shortness of breath [Wheezing] : no wheezing [Cough] : no cough [Orthopnea] : no orthopnea [PND] : no PND [Joint Stiffness] : no joint stiffness [Limb Pain] : no limb pain [Limb Swelling] : no limb swelling [FreeTextEntry9] : c/o ankle pain and left pinky discomfort

## 2019-04-01 NOTE — CONSULT LETTER
[Courtesy Letter:] : I had the pleasure of seeing your patient, [unfilled], in my office today. [Sincerely,] : Sincerely, [FreeTextEntry2] : Dr. Jackson De La Cruz (PCP)\par Dr. Bryan Naranjo (Pulm)\par Dr. Aidan Johnson (Spine Surgeon)  [FreeTextEntry3] : \par \par \par Seven Rios MD, FACS \par Chief, Division of Thoracic Surgery \par Director, Minimally Invasive Thoracic Surgery \par Department of Cardiovascular and Thoracic Surgery \par [facility] \par , Cardiovascular and Thoracic Surgery

## 2019-04-02 NOTE — ASSESSMENT
[FreeTextEntry1] : Salazar is a 73 year old male with history of HTN, pulmonary nodules S/P Right upper lobe lobectomy and dilated ascending aorta. He was entered into our Aortic Registry at  the time of his consult last year and is here today for a routine follow up.\par \par This visit, he complaints of Dyspnea on exertion, Pain to RT side incision site, fatigue , occasional Dizziness, occasional palpitations and swelling to 2nd LT hand and LT ankle. \par \par Today on exam patient's lungs clear bilaterally, normal sinus rhythm,  No peripheral edema noted. \par \par I have reviewed the indications for surgery,and to illustrate the aorta and anatomy of the heart. The patient does not meet size criteria for surgical intervention at the time. Review of the imaging shows his  aortic pathology has remained stable. Therefore, I have recommended that the patient will require a follow up appointment in 1 year   with CT non contrast  to monitor aortic pathology. My office will assist the patient with his upcoming appointment \par \par \par Plan:\par 1) Continue current medication regimen\par 2) Follow up with cardiologist and PCP\par 3) May return on as needed basis \par 4) BP management\par 5) Follow up in 1 year with Non contrast CT \par

## 2019-04-02 NOTE — HISTORY OF PRESENT ILLNESS
[FreeTextEntry1] : Salazar is a 73 year old male with history of HTN, pulmonary nodules S/P Right upper lobe lobectomy and dilated ascending aorta which had been noted while going under testing for lung cancer  as well as Cardiologist monitoring dilatation with yearly Echo and CT noticed an increase in size. He was entered into our Aortic Registry. \par \par CT Chest Non Contrast  03/18/2019 showed stable ascending aortic aneurysm @ 4.4 cm \par

## 2019-04-02 NOTE — CONSULT LETTER
[Dear  ___] : Dear ~FLEX, [Courtesy Letter:] : I had the pleasure of seeing your patient, [unfilled], in my office today. [Please see my note below.] : Please see my note below. [FreeTextEntry2] : Romero Crenshaw MD\par [address] [address]\par  \par [address], [place] [address]

## 2019-04-02 NOTE — PHYSICAL EXAM
[Sclera] : the sclera and conjunctiva were normal [PERRL With Normal Accommodation] : pupils were equal in size, round, and reactive to light [Neck Appearance] : the appearance of the neck was normal [] : no respiratory distress [Respiration, Rhythm And Depth] : normal respiratory rhythm and effort [Auscultation Breath Sounds / Voice Sounds] : lungs were clear to auscultation bilaterally [Apical Impulse] : the apical impulse was normal [Heart Rate And Rhythm] : heart rate was normal and rhythm regular [Heart Sounds] : normal S1 and S2 [Murmurs] : no murmurs [Examination Of The Chest] : the chest was normal in appearance [2+] : left 2+ [Breast Appearance] : normal in appearance [Bowel Sounds] : normal bowel sounds [Abdomen Soft] : soft [No CVA Tenderness] : no ~M costovertebral angle tenderness [Abnormal Walk] : normal gait [Involuntary Movements] : no involuntary movements were seen [Skin Color & Pigmentation] : normal skin color and pigmentation [Skin Turgor] : normal skin turgor [No Focal Deficits] : no focal deficits [Oriented To Time, Place, And Person] : oriented to person, place, and time [Impaired Insight] : insight and judgment were intact [Affect] : the affect was normal [Mood] : the mood was normal [FreeTextEntry1] : Deferred

## 2019-04-02 NOTE — REVIEW OF SYSTEMS
[Lower Ext Edema] : lower extremity edema [SOB on Exertion] : shortness of breath during exertion [Dizziness] : dizziness [Negative] : Heme/Lymph [Fever] : no fever [Chills] : no chills [Chest Pain] : no chest pain [Palpitations] : no palpitations [Fainting] : no fainting [Easy Bleeding] : no tendency for easy bleeding [Easy Bruising] : no tendency for easy bruising

## 2019-05-09 ENCOUNTER — APPOINTMENT (OUTPATIENT)
Dept: ORTHOPEDIC SURGERY | Facility: CLINIC | Age: 74
End: 2019-05-09
Payer: MEDICARE

## 2019-05-09 VITALS
HEART RATE: 63 BPM | BODY MASS INDEX: 27.02 KG/M2 | DIASTOLIC BLOOD PRESSURE: 86 MMHG | WEIGHT: 193 LBS | HEIGHT: 71 IN | SYSTOLIC BLOOD PRESSURE: 129 MMHG

## 2019-05-09 DIAGNOSIS — G56.02 CARPAL TUNNEL SYNDROME, LEFT UPPER LIMB: ICD-10-CM

## 2019-05-09 PROCEDURE — 99203 OFFICE O/P NEW LOW 30 MIN: CPT | Mod: 25

## 2019-05-09 PROCEDURE — 20526 THER INJECTION CARP TUNNEL: CPT | Mod: LT

## 2019-06-25 ENCOUNTER — APPOINTMENT (OUTPATIENT)
Dept: THORACIC SURGERY | Facility: CLINIC | Age: 74
End: 2019-06-25
Payer: MEDICARE

## 2019-06-25 VITALS
HEIGHT: 68 IN | DIASTOLIC BLOOD PRESSURE: 89 MMHG | SYSTOLIC BLOOD PRESSURE: 137 MMHG | BODY MASS INDEX: 28.79 KG/M2 | HEART RATE: 66 BPM | WEIGHT: 190 LBS | RESPIRATION RATE: 16 BRPM | OXYGEN SATURATION: 98 %

## 2019-06-25 PROCEDURE — 99214 OFFICE O/P EST MOD 30 MIN: CPT

## 2019-06-25 NOTE — PHYSICAL EXAM
[PERRL With Normal Accommodation] : pupils were equal in size, round, and reactive to light [Sclera] : the sclera and conjunctiva were normal [Neck Appearance] : the appearance of the neck was normal [] : no respiratory distress [Respiration, Rhythm And Depth] : normal respiratory rhythm and effort [Auscultation Breath Sounds / Voice Sounds] : lungs were clear to auscultation bilaterally [Heart Sounds] : normal S1 and S2 [Examination Of The Chest] : the chest was normal in appearance [Murmurs] : no murmurs [Abdomen Soft] : soft [Abdomen Tenderness] : non-tender [Cervical Lymph Nodes Enlarged Posterior Bilaterally] : posterior cervical [Cervical Lymph Nodes Enlarged Anterior Bilaterally] : anterior cervical [Abnormal Walk] : normal gait [Supraclavicular Lymph Nodes Enlarged Bilaterally] : supraclavicular [Skin Turgor] : normal skin turgor [Skin Color & Pigmentation] : normal skin color and pigmentation [No Focal Deficits] : no focal deficits [Affect] : the affect was normal [Oriented To Time, Place, And Person] : oriented to person, place, and time [Mood] : the mood was normal

## 2019-07-01 NOTE — ASSESSMENT
[FreeTextEntry1] : 73 year old male, follow up, s/p FB, Right VATS, RUL Lobectomy on 4/19/18, pathology revealed adenocarcinoma, predominantly acinar pattern, T1c N0. CK 7 and TTF-1 positive and negative for CK20. EVG satin highlights intact elastic visceral pleura.\par \par CXR on 6/19/19 reveals:\par - Lung fields clear without evidence of interstitial infiltrate, consolidation, or nodule\par - Aortic configuration stable in appearance\par \par I have reviewed the patient's medical records and diagnostic images during the time of this office visit, and I have made the following recommendation:\par 1.  Return to office for follow up with CT Chest in 3 months\par \par \par Written by Monalisa Alarcon NP, acting as a scribe for Seven Rose MD.\par \par The documentation recorded by the scribe accurately reflects the service I personally performed and the decisions made by me. SEVEN ROSE MD

## 2019-07-01 NOTE — CONSULT LETTER
[Courtesy Letter:] : I had the pleasure of seeing your patient, [unfilled], in my office today. [Please see my note below.] : Please see my note below. [Sincerely,] : Sincerely, [FreeTextEntry2] : Dr. Jackson D eLa Cruz (PCP)\par Dr. Bryan Naranjo (Pulm)\par Dr. Aidan Johnson (Spine Surgeon) \par  \par  [FreeTextEntry3] : \par \par \par Seven Rios MD, FACS \par Chief, Division of Thoracic Surgery \par Director, Minimally Invasive Thoracic Surgery \par Department of Cardiovascular and Thoracic Surgery \par [facility] \par , Cardiovascular and Thoracic Surgery

## 2019-07-01 NOTE — HISTORY OF PRESENT ILLNESS
[FreeTextEntry1] : Mr. Palencia is a 73 year old male who presents today for follow up.  He is s/p FB, Right VATS, RUL Lobectomy on 4/19/18, pathology revealed adenocarcinoma, predominantly acinar pattern, T1c N0. CK 7 and TTF-1 positive and negative for CK20. EVG satin highlights intact elastic visceral pleura.\par \par CXR on 6/19/19 reveals:\par - Lung fields clear without evidence of interstitial infiltrate, consolidation, or nodule\par - Aortic configuration stable in appearance\par \par He continues to report shortness of breath. He denies any fever, chills, cough, chest pain, hemoptysis, or recent illness.  Of note, he reports swimming 20 laps tree times weekly.  He does note recent left shoulder pain which he states may be related to swimming, however, plans to schedule visit with cardiology for evaluation.

## 2019-08-29 ENCOUNTER — APPOINTMENT (OUTPATIENT)
Dept: ORTHOPEDIC SURGERY | Facility: CLINIC | Age: 74
End: 2019-08-29
Payer: MEDICARE

## 2019-08-29 DIAGNOSIS — S63.639D SPRAIN OF INTERPHALANGEAL JOINT OF UNSPECIFIED FINGER, SUBSEQUENT ENCOUNTER: ICD-10-CM

## 2019-08-29 DIAGNOSIS — M67.449 GANGLION, UNSPECIFIED HAND: ICD-10-CM

## 2019-08-29 PROCEDURE — 99214 OFFICE O/P EST MOD 30 MIN: CPT

## 2019-09-20 ENCOUNTER — APPOINTMENT (OUTPATIENT)
Dept: CARDIOLOGY | Facility: CLINIC | Age: 74
End: 2019-09-20
Payer: MEDICARE

## 2019-09-20 VITALS
DIASTOLIC BLOOD PRESSURE: 79 MMHG | OXYGEN SATURATION: 96 % | WEIGHT: 186 LBS | HEART RATE: 61 BPM | HEIGHT: 68 IN | BODY MASS INDEX: 28.19 KG/M2 | SYSTOLIC BLOOD PRESSURE: 134 MMHG

## 2019-09-20 PROCEDURE — 99214 OFFICE O/P EST MOD 30 MIN: CPT

## 2019-09-20 PROCEDURE — 93000 ELECTROCARDIOGRAM COMPLETE: CPT

## 2019-09-20 NOTE — PHYSICAL EXAM
[General Appearance - Well Developed] : well developed [Normal Appearance] : normal appearance [General Appearance - Well Nourished] : well nourished [Well Groomed] : well groomed [No Deformities] : no deformities [General Appearance - In No Acute Distress] : no acute distress [Normal Conjunctiva] : the conjunctiva exhibited no abnormalities [Eyelids - No Xanthelasma] : the eyelids demonstrated no xanthelasmas [Normal Oral Mucosa] : normal oral mucosa [No Oral Pallor] : no oral pallor [No Oral Cyanosis] : no oral cyanosis [Normal Jugular Venous V Waves Present] : normal jugular venous V waves present [Normal Jugular Venous A Waves Present] : normal jugular venous A waves present [No Jugular Venous Sánchez A Waves] : no jugular venous sánchez A waves [Respiration, Rhythm And Depth] : normal respiratory rhythm and effort [Exaggerated Use Of Accessory Muscles For Inspiration] : no accessory muscle use [Auscultation Breath Sounds / Voice Sounds] : lungs were clear to auscultation bilaterally [Abdomen Soft] : soft [Abdomen Tenderness] : non-tender [Abdomen Mass (___ Cm)] : no abdominal mass palpated [Gait - Sufficient For Exercise Testing] : the gait was sufficient for exercise testing [Abnormal Walk] : normal gait [Nail Clubbing] : no clubbing of the fingernails [Cyanosis, Localized] : no localized cyanosis [Petechial Hemorrhages (___cm)] : no petechial hemorrhages [Skin Color & Pigmentation] : normal skin color and pigmentation [] : no rash [Skin Lesions] : no skin lesions [No Venous Stasis] : no venous stasis [No Skin Ulcers] : no skin ulcer [No Xanthoma] : no  xanthoma was observed [Oriented To Time, Place, And Person] : oriented to person, place, and time [FreeTextEntry1] : anxious [Normal Rate] : normal [Rhythm Regular] : regular [Normal S1] : normal S1 [Normal S2] : normal S2 [No Gallop] : no gallop heard [No Murmur] : no murmurs heard [Right Carotid Bruit] : no bruit heard over the right carotid [Left Carotid Bruit] : no bruit heard over the left carotid [2+] : left 2+ [Bruit] : no bruit heard [No Pitting Edema] : no pitting edema present

## 2019-09-20 NOTE — DISCUSSION/SUMMARY
[FreeTextEntry1] : 74 year old man with a history of hypertension presents for a followup cardiac evaluation. \par Salazar is doing well overall.  He notes that he had a stress test in 7/2019 at [facility] that showed defect apical inferior and mid inferior wall that was reversible with normal wall motion. The echo done at [facility] in 7/2019 showed normal LV function with a mild ascending aorta 4.15cm (which is unchanged) \par He has no anginal symptoms. He is concerned about his stress test result which I think is likely from artifact. I told him to get a CT coronaries but he does not want any radiation. Therefore, he will start with a TMET to assess his tolerance.  \par His blood pressure is controlled.  He will continue his Atenolol 50mg Qday and Losartan 100mg Qday.  \par His lipids were at goal on his last set of labs. \par He will followup with pulmonary for his lung nodule workup. \par Exercise and diet counseling was performed in order to reduce her future cardiovascular risk. \par He will followup with me in  3 months or sooner if necessary. \par

## 2019-09-20 NOTE — HISTORY OF PRESENT ILLNESS
[FreeTextEntry1] : 74 year old man with a history hypertension, hyperlipidemia.MRA aorta that showed an mildly dilated ascending aorta at 4.3cm. He incidentally was found to have 3.7cm irregular lesion in the lung. \par \par Since his last visit  he is  s/p FB, Right VATS, RUL Lobectomy on 4/19/18, pathology revealed adenocarcinoma. \par He recently saw a cardiologist at [facility]. He notes that he had a stress test in 7/2019 at [facility] that showed defect apical inferior and mid inferior wall that was reversible with normal wall motion. The echo done at [facility] in 7/2019 showed normal LV function with a mild ascending aorta 4.15.  \par  \par  He   denies any chest pain, dyspnea, PND, orthopnea, lower extremity edema, near syncope, syncope, strokelike symptoms. He is trying to swim about 3 times a week for about 40 minutes. He notices that he feels fatigued with it but able to do it. He is also starting to bike now. \par He denies any blurry vision, headaches or recent stroke like symptoms. \par He is compliant with his medications. \par \par

## 2019-09-20 NOTE — REVIEW OF SYSTEMS
[Feeling Fatigued] : feeling fatigued [Change In The Stool] : no change in stool [Anxiety] : no anxiety [Under Stress] : not under stress [Negative] : Heme/Lymph

## 2019-09-24 ENCOUNTER — APPOINTMENT (OUTPATIENT)
Dept: THORACIC SURGERY | Facility: CLINIC | Age: 74
End: 2019-09-24

## 2019-10-16 ENCOUNTER — APPOINTMENT (OUTPATIENT)
Dept: CARDIOLOGY | Facility: CLINIC | Age: 74
End: 2019-10-16
Payer: MEDICARE

## 2019-10-16 PROCEDURE — 93015 CV STRESS TEST SUPVJ I&R: CPT

## 2019-10-22 ENCOUNTER — APPOINTMENT (OUTPATIENT)
Dept: CARDIOLOGY | Facility: CLINIC | Age: 74
End: 2019-10-22
Payer: MEDICARE

## 2019-10-22 VITALS
BODY MASS INDEX: 28.34 KG/M2 | HEIGHT: 68 IN | DIASTOLIC BLOOD PRESSURE: 84 MMHG | WEIGHT: 187 LBS | HEART RATE: 60 BPM | SYSTOLIC BLOOD PRESSURE: 126 MMHG | OXYGEN SATURATION: 94 %

## 2019-10-22 PROCEDURE — 99214 OFFICE O/P EST MOD 30 MIN: CPT

## 2019-10-22 NOTE — PHYSICAL EXAM
[General Appearance - Well Developed] : well developed [Well Groomed] : well groomed [General Appearance - Well Nourished] : well nourished [Normal Appearance] : normal appearance [General Appearance - In No Acute Distress] : no acute distress [Normal Conjunctiva] : the conjunctiva exhibited no abnormalities [No Deformities] : no deformities [Normal Oral Mucosa] : normal oral mucosa [No Oral Pallor] : no oral pallor [Eyelids - No Xanthelasma] : the eyelids demonstrated no xanthelasmas [Normal Jugular Venous V Waves Present] : normal jugular venous V waves present [No Oral Cyanosis] : no oral cyanosis [Normal Jugular Venous A Waves Present] : normal jugular venous A waves present [No Jugular Venous Sánchez A Waves] : no jugular venous sánchez A waves [Auscultation Breath Sounds / Voice Sounds] : lungs were clear to auscultation bilaterally [Respiration, Rhythm And Depth] : normal respiratory rhythm and effort [Exaggerated Use Of Accessory Muscles For Inspiration] : no accessory muscle use [Abdomen Soft] : soft [Abdomen Tenderness] : non-tender [Abnormal Walk] : normal gait [Gait - Sufficient For Exercise Testing] : the gait was sufficient for exercise testing [Abdomen Mass (___ Cm)] : no abdominal mass palpated [Petechial Hemorrhages (___cm)] : no petechial hemorrhages [Nail Clubbing] : no clubbing of the fingernails [Cyanosis, Localized] : no localized cyanosis [] : no rash [Skin Color & Pigmentation] : normal skin color and pigmentation [No Skin Ulcers] : no skin ulcer [No Venous Stasis] : no venous stasis [Skin Lesions] : no skin lesions [No Xanthoma] : no  xanthoma was observed [Oriented To Time, Place, And Person] : oriented to person, place, and time [FreeTextEntry1] : anxious [Normal Rate] : normal [Rhythm Regular] : regular [Normal S2] : normal S2 [Normal S1] : normal S1 [No Gallop] : no gallop heard [No Murmur] : no murmurs heard [Right Carotid Bruit] : no bruit heard over the right carotid [Left Carotid Bruit] : no bruit heard over the left carotid [Bruit] : no bruit heard [2+] : right 2+ [No Pitting Edema] : no pitting edema present

## 2019-10-22 NOTE — REVIEW OF SYSTEMS
[Feeling Fatigued] : not feeling fatigued [Change In The Stool] : no change in stool [Anxiety] : no anxiety [Under Stress] : not under stress [Negative] : Heme/Lymph

## 2019-10-22 NOTE — HISTORY OF PRESENT ILLNESS
[FreeTextEntry1] : 74 year old man with a history hypertension, hyperlipidemia, mildly dilated ascending aorta (4.4cm). He incidentally was found to have 3.7cm irregular lesion in the lung  s/p FB, Right VATS, RUL Lobectomy on 4/19/18, pathology revealed adenocarcinoma.  \par He was diagnosed with COPD recently\par \par He  saw a cardiologist at [facility]. He notes that he had a stress test in 7/2019 at [facility] that showed defect apical inferior and mid inferior wall that was reversible with normal wall motion. The echo done at [facility] in 7/2019 showed normal LV function with a mild ascending aorta 4.15.  \par \par Since his last visit  he is doing well. He had a plain stress test on 10/16/19 with excellent exercise tolerance without any anginal symptoms and without EKG changes.\par  \par  He   denies any chest pain, dyspnea, PND, orthopnea, lower extremity edema, near syncope, syncope, strokelike symptoms. He is trying to swim about 3 times a week for about 40 minutes.  \par He denies any blurry vision, headaches or recent stroke like symptoms. \par He is compliant with his medications. \par \par

## 2019-10-22 NOTE — DISCUSSION/SUMMARY
[FreeTextEntry1] : 74 year old man with a history of hypertension presents for a followup cardiac evaluation. \par Salazar is doing well overall.  \par He denies any anginal symptoms. Clinically he is euvolemic on exam. He notes that he had a stress test in 7/2019 at [facility] that showed defect apical inferior and mid inferior wall that was reversible with normal wall motion. The echo done at [facility] in 7/2019 showed normal LV function with a mild ascending aorta 4.15cm (which is unchanged). He had a plain stress test on 10/16/19 with excellent exercise tolerance without any anginal symptoms and without EKG changes. At this point given he has no anginal symptoms, I would defer any further cardiac testing. Possibly his previous defect was from diaphragmatic attenuation. If his symptomatology changes,  I told him to get a CT coronaries but currently he does not want any radiation.  \par He had  CT chest in 3/2019 that showed an ascending aortic aneurysm of 4.4cm. He is planning on getting a surveillance CT for his lung Ca soon. \par His blood pressure is controlled.  He will continue his Atenolol 50mg Qday and Losartan 100mg Qday.  \par His lipids were at goal on his last set of labs. \par He will followup with pulmonary for his COPD. \par Exercise and diet counseling was performed in order to reduce her future cardiovascular risk. \par He will followup with me in  3 months or sooner if necessary. \par

## 2019-10-29 ENCOUNTER — APPOINTMENT (OUTPATIENT)
Dept: THORACIC SURGERY | Facility: CLINIC | Age: 74
End: 2019-10-29
Payer: MEDICARE

## 2019-10-29 VITALS
BODY MASS INDEX: 28.04 KG/M2 | HEART RATE: 67 BPM | SYSTOLIC BLOOD PRESSURE: 130 MMHG | HEIGHT: 68 IN | DIASTOLIC BLOOD PRESSURE: 82 MMHG | RESPIRATION RATE: 17 BRPM | WEIGHT: 185 LBS | OXYGEN SATURATION: 95 % | TEMPERATURE: 98.9 F

## 2019-10-29 PROCEDURE — 99214 OFFICE O/P EST MOD 30 MIN: CPT

## 2019-10-29 RX ORDER — LOSARTAN POTASSIUM AND HYDROCHLOROTHIAZIDE 25; 100 MG/1; MG/1
100-25 TABLET ORAL DAILY
Refills: 0 | Status: DISCONTINUED | COMMUNITY
End: 2019-10-29

## 2019-11-04 NOTE — HISTORY OF PRESENT ILLNESS
[FreeTextEntry1] : 74 year old male, follow up, s/p FB, Right VATS, RUL Lobectomy on 4/19/18, pathology revealed T1c N0 adenocarcinoma. \par \par CT chest scan on 10/22/19 revealed:\par - s/p right upper lobectomy. Postsurgical changes appear stable in the right hemithorax.  No suspicious nodule has developed. \par - On the left, the tiny nodule along the left major fissure on 3:26, tiny nodule in the RENAE anterior to the major fissure on image 38, and the LLL groundglass nodular density on image 61 are unchanged.  The latter is a somewhat oval shaped and measures approximately 5 x 4 mm on image 9:226. \par - there is mild diffuse bronchial airways wall thickening indicating diffuse airways inflammatory disease or reactive airways.  There is minimal peribronchial groundglass in the LLL (9:225) and RLL (image 125).  On review of prior studies, this appears unchanged going back to October 2018.  \par \par CXR on 6/19/19 reveals:\par - Lung fields clear without evidence of interstitial infiltrate, consolidation, or nodule\par - Aortic configuration stable in appearance\par  \par He reports that he generally feels well, but admits to shortness of breath with exertion, postnasal drip and an occasional cough which is productive of clear sputum.  He denies any fever, chills, chest pain, hemoptysis, or recent illness.

## 2019-11-04 NOTE — CONSULT LETTER
[FreeTextEntry2] : Dr. Jackson De La Cruz (PCP)\par Dr. Bryan Naranjo (Pulm)\par Dr. Aidan Johnson (Spine Surgeon) \par  [FreeTextEntry3] : Seven Rios MD, FACS \par Chief, Division of Thoracic Surgery \par Director, Minimally Invasive Thoracic Surgery \par Department of Cardiovascular and Thoracic Surgery \par [facility] \par , Cardiovascular and Thoracic Surgery \par [facility] [employer] [facility] \par

## 2019-11-04 NOTE — ASSESSMENT
[FreeTextEntry1] : 74 year old male, follow up, s/p FB, Right VATS, RUL Lobectomy on 4/19/18, pathology revealed T1c N0 adenocarcinoma. \par \par CT chest scan on 10/22/19 revealed:\par - s/p right upper lobectomy. Postsurgical changes appear stable in the right hemithorax.  No suspicious nodule has developed. \par - On the left, the tiny nodule along the left major fissure on 3:26, tiny nodule in the RENAE anterior to the major fissure on image 38, and the LLL groundglass nodular density on image 61 are unchanged.  The latter is a somewhat oval shaped and measures approximately 5 x 4 mm on image 9:226. \par - there is mild diffuse bronchial airways wall thickening indicating diffuse airways inflammatory disease or reactive airways.  There is minimal peribronchial groundglass in the LLL (9:225) and RLL (image 125).  On review of prior studies, this appears unchanged going back to October 2018.  \par \par I have reviewed the patient's medical records and diagnostic images during the time of this office visit, and I have made the following recommendation:\par 1.  Follow up in 3 months with CXR.\par \par Written by Kimberly Beach NP, acting as a scribe for BRITTNI ROSE MD.\par \par The documentation recorded by the scribe accurately reflects the service I personally performed and the decisions made by me. BRITTNI ROSE MD\par

## 2019-11-04 NOTE — REVIEW OF SYSTEMS
[Cough] : cough [SOB on Exertion] : shortness of breath during exertion [Negative] : Heme/Lymph [FreeTextEntry6] : + postnasal drip

## 2019-11-04 NOTE — PHYSICAL EXAM
[Sclera] : the sclera and conjunctiva were normal [Neck Appearance] : the appearance of the neck was normal [] : the neck was supple [Neck Cervical Mass (___cm)] : no neck mass was observed [Respiration, Rhythm And Depth] : normal respiratory rhythm and effort [Auscultation Breath Sounds / Voice Sounds] : lungs were clear to auscultation bilaterally [Heart Rate And Rhythm] : heart rate was normal and rhythm regular [Heart Sounds] : normal S1 and S2 [Examination Of The Chest] : the chest was normal in appearance [Abdomen Soft] : soft [Abdomen Tenderness] : non-tender [Cervical Lymph Nodes Enlarged Posterior Bilaterally] : posterior cervical [Cervical Lymph Nodes Enlarged Anterior Bilaterally] : anterior cervical [Supraclavicular Lymph Nodes Enlarged Bilaterally] : supraclavicular [No CVA Tenderness] : no ~M costovertebral angle tenderness [Abnormal Walk] : normal gait [Skin Color & Pigmentation] : normal skin color and pigmentation [No Focal Deficits] : no focal deficits [Oriented To Time, Place, And Person] : oriented to person, place, and time [Impaired Insight] : insight and judgment were intact [Affect] : the affect was normal [FreeTextEntry1] : deferred

## 2020-06-24 ENCOUNTER — APPOINTMENT (OUTPATIENT)
Dept: CARDIOLOGY | Facility: CLINIC | Age: 75
End: 2020-06-24

## 2020-07-16 ENCOUNTER — NON-APPOINTMENT (OUTPATIENT)
Age: 75
End: 2020-07-16

## 2020-07-16 ENCOUNTER — APPOINTMENT (OUTPATIENT)
Dept: CARDIOLOGY | Facility: CLINIC | Age: 75
End: 2020-07-16
Payer: MEDICARE

## 2020-07-16 VITALS — DIASTOLIC BLOOD PRESSURE: 88 MMHG | SYSTOLIC BLOOD PRESSURE: 134 MMHG

## 2020-07-16 VITALS
OXYGEN SATURATION: 95 % | SYSTOLIC BLOOD PRESSURE: 145 MMHG | HEART RATE: 64 BPM | DIASTOLIC BLOOD PRESSURE: 83 MMHG | HEIGHT: 68 IN | BODY MASS INDEX: 28.19 KG/M2 | WEIGHT: 186 LBS

## 2020-07-16 PROCEDURE — 99214 OFFICE O/P EST MOD 30 MIN: CPT

## 2020-07-16 PROCEDURE — 93000 ELECTROCARDIOGRAM COMPLETE: CPT

## 2020-07-16 NOTE — HISTORY OF PRESENT ILLNESS
[FreeTextEntry1] : 75  year old man with a history hypertension, hyperlipidemia, mildly dilated ascending aorta (4.4cm). He incidentally was found to have 3.7cm irregular lesion in the lung  s/p FB, Right VATS, RUL Lobectomy on 4/19/18, pathology revealed adenocarcinoma.  \par He was diagnosed with COPD recently\par \par He  saw a cardiologist at [facility]. He notes that he had a stress test in 7/2019 at [facility] that showed defect apical inferior and mid inferior wall that was reversible with normal wall motion. The echo done at [facility] in 7/2019 showed normal LV function with a mild ascending aorta 4.15.  \par He had a plain stress test on 10/16/19 with excellent exercise tolerance without any anginal symptoms and without EKG changes.\par \par Since his last visit  he is still complaining of dyspnea which improves with progressive exercise. He is able to swim. He saw the pulmonologist who felt that he had moderate obstructive airway.  \par  He   denies any chest pain, dyspnea, PND, orthopnea, lower extremity edema, near syncope, syncope, strokelike symptoms. He denies any blurry vision, headaches or recent stroke like symptoms. \par He is compliant with his medications. \par \par

## 2020-07-16 NOTE — REVIEW OF SYSTEMS
[Feeling Fatigued] : not feeling fatigued [Shortness Of Breath] : shortness of breath [Dyspnea on exertion] : dyspnea during exertion [Change In The Stool] : no change in stool [Anxiety] : no anxiety [Under Stress] : not under stress [Negative] : Psychiatric

## 2020-07-16 NOTE — PHYSICAL EXAM
[General Appearance - Well Developed] : well developed [Normal Appearance] : normal appearance [No Deformities] : no deformities [General Appearance - Well Nourished] : well nourished [Well Groomed] : well groomed [Eyelids - No Xanthelasma] : the eyelids demonstrated no xanthelasmas [Normal Conjunctiva] : the conjunctiva exhibited no abnormalities [General Appearance - In No Acute Distress] : no acute distress [No Oral Cyanosis] : no oral cyanosis [Normal Oral Mucosa] : normal oral mucosa [No Oral Pallor] : no oral pallor [No Jugular Venous Sánchez A Waves] : no jugular venous sánchez A waves [Normal Jugular Venous V Waves Present] : normal jugular venous V waves present [Normal Jugular Venous A Waves Present] : normal jugular venous A waves present [Respiration, Rhythm And Depth] : normal respiratory rhythm and effort [Exaggerated Use Of Accessory Muscles For Inspiration] : no accessory muscle use [Abdomen Tenderness] : non-tender [Auscultation Breath Sounds / Voice Sounds] : lungs were clear to auscultation bilaterally [Abdomen Soft] : soft [Abnormal Walk] : normal gait [Abdomen Mass (___ Cm)] : no abdominal mass palpated [Cyanosis, Localized] : no localized cyanosis [Nail Clubbing] : no clubbing of the fingernails [Gait - Sufficient For Exercise Testing] : the gait was sufficient for exercise testing [] : no rash [Skin Color & Pigmentation] : normal skin color and pigmentation [Petechial Hemorrhages (___cm)] : no petechial hemorrhages [No Skin Ulcers] : no skin ulcer [Skin Lesions] : no skin lesions [No Venous Stasis] : no venous stasis [Oriented To Time, Place, And Person] : oriented to person, place, and time [No Xanthoma] : no  xanthoma was observed [Normal Rate] : normal [FreeTextEntry1] : anxious [Rhythm Regular] : regular [Normal S1] : normal S1 [Normal S2] : normal S2 [No Gallop] : no gallop heard [No Murmur] : no murmurs heard [Left Carotid Bruit] : no bruit heard over the left carotid [Right Carotid Bruit] : no bruit heard over the right carotid [2+] : left 2+ [No Pitting Edema] : no pitting edema present [Bruit] : no bruit heard

## 2020-07-16 NOTE — DISCUSSION/SUMMARY
[FreeTextEntry1] : 74 year old man with a history of hypertension presents for a followup cardiac evaluation. \par Salazar is doing well overall.  He denies any anginal symptoms. Clinically he is euvolemic on exam. He notes that he had a stress test in 7/2019 at [facility] that showed defect apical inferior and mid inferior wall that was reversible with normal wall motion. The echo done at [facility] in 7/2019 showed normal LV function with a mild ascending aorta 4.15cm (which is unchanged). He had a plain stress test on 10/16/19 with excellent exercise tolerance without any anginal symptoms and without EKG changes. At this point given he has no anginal symptoms, I would defer any further cardiac testing. Possibly his previous defect was from diaphragmatic attenuation. If his symptomatology changes,  I will get him to get a CT coronaries.\par His CT scan in 4/2020 showed the Ascending aorta at 4.3cm (unchanged)\par His blood pressure is controlled.  He will continue his Atenolol 100mg Qday and olmesartan 40mg Qday. \par His lipids were at goal on his last set of labs. \par He will followup with pulmonary for his COPD which is the main culprit for his dyspnea. \par Exercise and diet counseling was performed in order to reduce her future cardiovascular risk. \par He will followup with me in  6 months or sooner if necessary. \par

## 2020-10-06 ENCOUNTER — APPOINTMENT (OUTPATIENT)
Dept: THORACIC SURGERY | Facility: CLINIC | Age: 75
End: 2020-10-06

## 2020-12-18 PROBLEM — R91.1 LUNG NODULE: Status: ACTIVE | Noted: 2018-04-03

## 2020-12-22 ENCOUNTER — APPOINTMENT (OUTPATIENT)
Dept: THORACIC SURGERY | Facility: CLINIC | Age: 75
End: 2020-12-22
Payer: MEDICARE

## 2020-12-22 VITALS
DIASTOLIC BLOOD PRESSURE: 72 MMHG | HEART RATE: 58 BPM | BODY MASS INDEX: 27.58 KG/M2 | RESPIRATION RATE: 16 BRPM | HEIGHT: 68 IN | WEIGHT: 182 LBS | SYSTOLIC BLOOD PRESSURE: 123 MMHG | OXYGEN SATURATION: 95 %

## 2020-12-22 DIAGNOSIS — R91.1 SOLITARY PULMONARY NODULE: ICD-10-CM

## 2020-12-22 PROCEDURE — 99214 OFFICE O/P EST MOD 30 MIN: CPT

## 2020-12-22 NOTE — HISTORY OF PRESENT ILLNESS
[FreeTextEntry1] : 75 year old male, follow up, s/p FB, Right VATS, RUL Lobectomy on 4/19/18, pathology revealed T1c N0 adenocarcinoma. \par \par CT chest scan on 10/22/19 revealed:\par - s/p right upper lobectomy. Postsurgical changes appear stable in the right hemithorax. No suspicious nodule has developed. \par - On the left, the tiny nodule along the left major fissure on 3:26, tiny nodule in the RENAE anterior to the major fissure on image 38, and the LLL groundglass nodular density on image 61 are unchanged. The latter is a somewhat oval shaped and measures approximately 5 x 4 mm on image 9:226. \par - there is mild diffuse bronchial airways wall thickening indicating diffuse airways inflammatory disease or reactive airways. There is minimal peribronchial groundglass in the LLL (9:225) and RLL (image 125). On review of prior studies, this appears unchanged going back to October 2018. \par \par CXR on 6/19/19 reveals:\par - Lung fields clear without evidence of interstitial infiltrate, consolidation, or nodule\par - Aortic configuration stable in appearance\par \par CT Chest on 4/1/2020 stable. Patient reports no new complaints.\par \par CT chest on 12/1/20\par - s/p RULobectomy; Unchanged linear scarring seen in the RML and RLL\par - Previously described focal ggo seen in the LLL has resolved\par - Unchanged, 3 mm perifissural nodule again seen in the RENAE (series 4, image 91) \par - Unchanged 2 mm pulmonary nodule seen in RENAE (series 4, image 136)\par - Ascending thoracic aorta measuring 4.4 cm; Unchanged \par - Simple appearing cyst upper pole right kidney measures 4.7 x 4.3 cm\par \par Patient presents to office for follow up. Patient c/o chronic SOB, denies cough, chest pain, fever, chills.

## 2020-12-22 NOTE — DATA REVIEWED
[FreeTextEntry1] : CT chest on 12/1/20\par - s/p RULobectomy; Unchanged linear scarring seen in the RML and RLL\par - Previously described focal ggo seen in the LLL has resolved\par - Unchanged, 3 mm perifissural nodule again seen in the RENAE (series 4, image 91) \par - Unchanged 2 mm pulmonary nodule seen in RENAE (series 4, image 136)\par - Ascending thoracic aorta measuring 4.4 cm; Unchanged \par - Simple appearing cyst upper pole right kidney measures 4.7 x 4.3 cm

## 2020-12-22 NOTE — ASSESSMENT
[FreeTextEntry1] : 75 year old male, follow up, s/p FB, Right VATS, RUL Lobectomy on 4/19/18, pathology revealed T1c N0 adenocarcinoma. \par \par CT chest on 12/1/20\par - s/p RULobectomy; Unchanged linear scarring seen in the RML and RLL\par - Previously described focal ggo seen in the LLL has resolved\par - Unchanged, 3 mm perifissural nodule again seen in the RENAE (series 4, image 91) \par - Unchanged 2 mm pulmonary nodule seen in RENAE (series 4, image 136)\par - Ascending thoracic aorta measuring 4.4 cm; Unchanged \par - Simple appearing cyst upper pole right kidney measures 4.7 x 4.3 cm\par \par I have reviewed the patient's medical records and diagnostic images at time of this office consultation and have made the following recommendation:\par 1. CT chest reviewed and explained to patient, I recommended patient to return to office in 6 months with CXR. \par \par I personally performed the services described in the documentation, reviewed the documentation recorded by the scribe in my presence and it accurately and completely records my words and actions.\par \par I, Meri Bynum, NP, am scribing for and the presence of JONEL Beckford, the following sections HISTORY OF PRESENT ILLNESS, PAST MEDICAL/FAMILY/SOCIAL HISTORY; REVIEW OF SYSTEMS; VITAL SIGNS; PHYSICAL EXAM; DISPOSITION.

## 2020-12-22 NOTE — CONSULT LETTER
[Dear  ___] : Dear  [unfilled], [Consult Letter:] : I had the pleasure of evaluating your patient, [unfilled]. [Please see my note below.] : Please see my note below. [Consult Closing:] : Thank you very much for allowing me to participate in the care of this patient.  If you have any questions, please do not hesitate to contact me. [Sincerely,] : Sincerely, [( Thank you for referring [unfilled] for consultation for _____ )] : Thank you for referring [unfilled] for consultation for [unfilled] [FreeTextEntry2] : Dr. Jackson De La Cruz (PCP)\par Dr. Jono Perez (Pulm)\par Dr. Aidan Johnson (Spine Surgeo [FreeTextEntry3] : Seven Rios MD, FACS \par Chief, Division of Thoracic Surgery \par Director, Minimally Invasive Thoracic Surgery \par Department of Cardiovascular and Thoracic Surgery \par [facility] \par , Cardiovascular and Thoracic Surgery\par [facility]\par [address]\par Oncology [address]\[place] [address]\par Tel: [phone]\par Fax: [phone]\par \par \par \par

## 2021-03-22 NOTE — H&P PST ADULT - NSANTHAGERD_ENT_A_CORE
CERTIFICATE OF WORK    3/22/2021      Re: Pramod Parsley        [address]      This is to certify that Pramod Aguilera has been under my care from3/22/2021 and is able to return to work June 9.     RESTRICTIONS: none            SIGNATURE:___________________________________________,   3/22/2021  [address] [place] [address]  [address] [place] [address] [id number] [phone]
Yes

## 2021-03-29 ENCOUNTER — NON-APPOINTMENT (OUTPATIENT)
Age: 76
End: 2021-03-29

## 2021-03-29 DIAGNOSIS — Z80.52 FAMILY HISTORY OF MALIGNANT NEOPLASM OF BLADDER: ICD-10-CM

## 2021-03-29 DIAGNOSIS — Z83.3 FAMILY HISTORY OF DIABETES MELLITUS: ICD-10-CM

## 2021-03-29 DIAGNOSIS — Z86.39 PERSONAL HISTORY OF OTHER ENDOCRINE, NUTRITIONAL AND METABOLIC DISEASE: ICD-10-CM

## 2021-04-26 ENCOUNTER — RESULT REVIEW (OUTPATIENT)
Age: 76
End: 2021-04-26

## 2021-05-26 ENCOUNTER — NON-APPOINTMENT (OUTPATIENT)
Age: 76
End: 2021-05-26

## 2021-05-26 ENCOUNTER — APPOINTMENT (OUTPATIENT)
Dept: CARDIOLOGY | Facility: CLINIC | Age: 76
End: 2021-05-26
Payer: MEDICARE

## 2021-05-26 VITALS
OXYGEN SATURATION: 95 % | DIASTOLIC BLOOD PRESSURE: 92 MMHG | BODY MASS INDEX: 2.73 KG/M2 | HEIGHT: 68 IN | HEART RATE: 62 BPM | WEIGHT: 18 LBS | SYSTOLIC BLOOD PRESSURE: 162 MMHG

## 2021-05-26 PROCEDURE — 99214 OFFICE O/P EST MOD 30 MIN: CPT

## 2021-05-26 PROCEDURE — 93000 ELECTROCARDIOGRAM COMPLETE: CPT

## 2021-05-26 PROCEDURE — 93306 TTE W/DOPPLER COMPLETE: CPT

## 2021-05-26 RX ORDER — UMECLIDINIUM BROMIDE AND VILANTEROL TRIFENATATE 62.5; 25 UG/1; UG/1
62.5-25 POWDER RESPIRATORY (INHALATION)
Refills: 0 | Status: DISCONTINUED | COMMUNITY
End: 2021-05-26

## 2021-05-26 NOTE — DISCUSSION/SUMMARY
[FreeTextEntry1] : 76  year old man with a history of hypertension presents for a followup cardiac evaluation. \par Salazar is complaining of worsning MUNOZ. His EKG did not reveal any significant ischemic changes.  He notes that he had a stress test in 7/2019 at [facility] that showed defect apical inferior and mid inferior wall that was reversible with normal wall motion. The echo done at [facility] in 7/2019 showed normal LV function with a mild ascending aorta 4.15cm (which is unchanged). He had a plain stress test on 10/16/19 with excellent exercise tolerance without any anginal symptoms and without EKG changes. At this point given his previous testing he will get   640 slice cardiac CT to define his coronary anatomy and to rule out significant CAD. \par His CT scan in 4/2020 showed the Ascending aorta at 4.3cm (unchanged)\par His blood pressure is controlled.  He will continue his Atenolol 100mg Qday and olmesartan 40mg Qday. \par His lipids were at goal on his last set of labs. \par He will followup with pulmonary for his COPD which is the main culprit for his dyspnea. \par His LDL is at goal. We spoke about his coronary calcification but he is reluctant to start a statin. \par Exercise and diet counseling was performed in order to reduce her future cardiovascular risk. \par He will followup with me in  3 months or sooner if necessary. \par

## 2021-05-26 NOTE — HISTORY OF PRESENT ILLNESS
[FreeTextEntry1] : 76  year old man with a history hypertension, hyperlipidemia, mildly dilated ascending aorta (4.4cm). He incidentally was found to have 3.7cm irregular lesion in the lung  s/p FB, Right VATS, RUL Lobectomy on 4/19/18, pathology revealed adenocarcinoma,  COPD.\par \par He  saw a cardiologist at [facility] in 2019.. He notes that he had a stress test in 7/2019 at [facility] that showed defect apical inferior and mid inferior wall that was reversible with normal wall motion. The echo done at [facility] in 7/2019 showed normal LV function with a mild ascending aorta 4.15.  \par He had a plain stress test on 10/16/19 with excellent exercise tolerance without any anginal symptoms and without EKG changes.\par \par Since his last visit  he is still complaining of worsening MUNOZ when climbing stairs and when walking on an incline. \par  He   denies any chest pain, dyspnea, PND, orthopnea, lower extremity edema, near syncope, syncope, strokelike symptoms. He denies any blurry vision, headaches or recent stroke like symptoms. \par \par he recently had microscopic hematuria and is pending a cystoscopy. \par He is compliant with his medications. \par \par

## 2021-05-26 NOTE — PHYSICAL EXAM
[General Appearance - Well Developed] : well developed [Normal Appearance] : normal appearance [Well Groomed] : well groomed [General Appearance - Well Nourished] : well nourished [No Deformities] : no deformities [General Appearance - In No Acute Distress] : no acute distress [Normal Conjunctiva] : the conjunctiva exhibited no abnormalities [Eyelids - No Xanthelasma] : the eyelids demonstrated no xanthelasmas [Normal Oral Mucosa] : normal oral mucosa [No Oral Pallor] : no oral pallor [No Oral Cyanosis] : no oral cyanosis [Normal Jugular Venous A Waves Present] : normal jugular venous A waves present [Normal Jugular Venous V Waves Present] : normal jugular venous V waves present [No Jugular Venous Sánchez A Waves] : no jugular venous sánchez A waves [Respiration, Rhythm And Depth] : normal respiratory rhythm and effort [Exaggerated Use Of Accessory Muscles For Inspiration] : no accessory muscle use [Auscultation Breath Sounds / Voice Sounds] : lungs were clear to auscultation bilaterally [Abdomen Soft] : soft [Abdomen Tenderness] : non-tender [Abdomen Mass (___ Cm)] : no abdominal mass palpated [Abnormal Walk] : normal gait [Gait - Sufficient For Exercise Testing] : the gait was sufficient for exercise testing [Nail Clubbing] : no clubbing of the fingernails [Petechial Hemorrhages (___cm)] : no petechial hemorrhages [Cyanosis, Localized] : no localized cyanosis [Skin Color & Pigmentation] : normal skin color and pigmentation [] : no rash [No Venous Stasis] : no venous stasis [Skin Lesions] : no skin lesions [No Skin Ulcers] : no skin ulcer [No Xanthoma] : no  xanthoma was observed [Oriented To Time, Place, And Person] : oriented to person, place, and time [FreeTextEntry1] : anxious [Normal Rate] : normal [Rhythm Regular] : regular [Normal S1] : normal S1 [Normal S2] : normal S2 [No Gallop] : no gallop heard [No Murmur] : no murmurs heard [Right Carotid Bruit] : no bruit heard over the right carotid [Left Carotid Bruit] : no bruit heard over the left carotid [2+] : left 2+ [Bruit] : no bruit heard [No Pitting Edema] : no pitting edema present

## 2021-06-07 ENCOUNTER — APPOINTMENT (OUTPATIENT)
Dept: CARDIOLOGY | Facility: CLINIC | Age: 76
End: 2021-06-07

## 2021-06-07 ENCOUNTER — APPOINTMENT (OUTPATIENT)
Dept: CT IMAGING | Facility: CLINIC | Age: 76
End: 2021-06-07

## 2021-06-11 ENCOUNTER — NON-APPOINTMENT (OUTPATIENT)
Age: 76
End: 2021-06-11

## 2021-06-11 ENCOUNTER — APPOINTMENT (OUTPATIENT)
Dept: GASTROENTEROLOGY | Facility: CLINIC | Age: 76
End: 2021-06-11
Payer: MEDICARE

## 2021-06-11 VITALS
HEART RATE: 66 BPM | DIASTOLIC BLOOD PRESSURE: 80 MMHG | SYSTOLIC BLOOD PRESSURE: 130 MMHG | WEIGHT: 187 LBS | TEMPERATURE: 97.1 F | BODY MASS INDEX: 28.34 KG/M2 | OXYGEN SATURATION: 94 % | HEIGHT: 68 IN

## 2021-06-11 PROCEDURE — 99214 OFFICE O/P EST MOD 30 MIN: CPT

## 2021-06-11 NOTE — REVIEW OF SYSTEMS
[Shortness Of Breath] : shortness of breath [Wheezing] : no wheezing [Cough] : no cough [SOB on Exertion] : shortness of breath during exertion [Orthopnea] : no orthopnea [PND] : no PND [Negative] : Heme/Lymph [FreeTextEntry5] : Grade 1 dyspnea. [FreeTextEntry8] : Microscopic hematuria

## 2021-06-11 NOTE — ASSESSMENT
[FreeTextEntry1] : Patient present with diffuse abdominal discomfort and bloating.  Recently have changed his diet including more green leafy vegetables and broccoli which is probably responsible for the symptoms.  Findings suggestive of irritable bowel syndrome.  Mildly dilated bile duct from the postcholecystectomy state.  Mildly dilated pancreatic duct had history of pancreatitis in the past no evidence of pancreatic mass.  Patient has history of hypertension, coronary artery disease with ischemia of apical inferior and mid inferior wall of the heart, prediabetic hemoglobin A1c is 6.3.  History of carcinoma of the right upper lung post right upper lobe lobectomy.  Dilated  ascending aorta at 4.1 cm stable.  Microscopic hematuria from benign prostate hypertrophy.\par \par Patient counseled on diet.  He is to continue on his present medications of atenolol 100 mg a day and olmesartan 40 mg a day.  No new medication added.  His recently done blood work-up was reviewed no significant abnormality noted.\par \par Patient had many questions time taken to answer all of them.

## 2021-06-11 NOTE — HISTORY OF PRESENT ILLNESS
[Heartburn] : denies heartburn [Nausea] : denies nausea [Vomiting] : denies vomiting [Diarrhea] : denies diarrhea [Constipation] : denies constipation [Yellow Skin Or Eyes (Jaundice)] : denies jaundice [Abdominal Pain] : denies abdominal pain [Abdominal Swelling] : denies abdominal swelling [Rectal Pain] : denies rectal pain [Cholelithiasis] : cholelithiasis [Malignancy] : malignancy [Abdominal Surgery] : abdominal surgery [Cholecystectomy] : cholecystectomy [Wt Gain ___ Lbs] : no recent weight gain [Wt Loss ___ Lbs] : no recent weight loss [GERD] : no gastroesophageal reflux disease [Hiatus Hernia] : no hiatus hernia [Peptic Ulcer Disease] : no peptic ulcer disease [Pancreatitis] : no pancreatitis [Kidney Stone] : no kidney stone [Inflammatory Bowel Disease] : no inflammatory bowel disease [Irritable Bowel Syndrome] : no irritable bowel syndrome [Diverticulitis] : no diverticulitis [Alcohol Abuse] : no alcohol abuse [Appendectomy] : no appendectomy [de-identified] : For last few months patient has been feeling diffuse abdominal discomfort.  He denies any abdominal pain.  His bowel habits are normal every day without any blood in the stool.  He had a colonoscopy in 2018 July which was normal except for internal and external hemorrhoids and angiodysplasia in the cecum.  Due to the history of colon polyps a follow-up colonoscopy in 5 years was advised.  He has no upper GI symptoms in the form of heartburn, dysphagia, epigastric pain.  History of frequent burping and hiccups since March of this year it is started after he had Covid infection. [FreeTextEntry1] : He has history of hypertension, coronary artery disease, a nuclear stress test showed a small apical inferior and mid inferior wall ischemia.  He does not have any angina.  He does get short of breath on walking uphill or climbing more than 2 flights of stairs.  Follows with cardiologist and pulmonary consultant.  He was advised CT coronary angiogram but refused due to the fear of radiation exposure.  A history of carcinoma of the right upper lung had right upper lung lobectomy in April 2018 and follows with the cardiothoracic surgeon.  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  There was no bladder carcinoma.  He had a benign prostate hypertrophy and that was thought to be because of his microscopic hematuria.  Patient had COVID-19 infection in March 2021 and has developed antibodies.  CT chest in December showed mild scarring in the right middle lobe and right lower lobe there were 2 2 and 3 mm nodule in the left lung which has not changed over the last 3 years.  Groundglass opacity seen in left lung in the past CT scan has resolved.  There is no longer's seen thickening in the tracheobronchial tree which was noted on his last CT chest.  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 Gaffney and wound healed from the bottom since then he has had no further discharge from the fistula in ano.  Follows with Dr. Gaffney for that.. Is a history of lumbar spinal stenosis at L4-L5 level with lower back pain radiating to the leg which was treated by partial bilateral laminectomy at L4-L5 level and foraminotomy at the same level since then no back pain.  He also had surgery on his finger for injury left with a tendinosis.

## 2021-06-11 NOTE — REASON FOR VISIT
[Follow-Up: _____] : a [unfilled] follow-up visit [FreeTextEntry1] : Diffuse abdominal discomfort.  Shortness of breath on exertion.

## 2021-06-11 NOTE — PHYSICAL EXAM
[General Appearance - Alert] : alert [General Appearance - In No Acute Distress] : in no acute distress [Sclera] : the sclera and conjunctiva were normal [PERRL With Normal Accommodation] : pupils were equal in size, round, and reactive to light [Extraocular Movements] : extraocular movements were intact [Outer Ear] : the ears and nose were normal in appearance [Oropharynx] : the oropharynx was normal [Neck Appearance] : the appearance of the neck was normal [Neck Cervical Mass (___cm)] : no neck mass was observed [Jugular Venous Distention Increased] : there was no jugular-venous distention [Thyroid Diffuse Enlargement] : the thyroid was not enlarged [Thyroid Nodule] : there were no palpable thyroid nodules [Auscultation Breath Sounds / Voice Sounds] : lungs were clear to auscultation bilaterally [Heart Rate And Rhythm] : heart rate was normal and rhythm regular [Heart Sounds] : normal S1 and S2 [Heart Sounds Gallop] : no gallops [Murmurs] : no murmurs [Heart Sounds Pericardial Friction Rub] : no pericardial rub [Full Pulse] : the pedal pulses are present [Edema] : there was no peripheral edema [Breast Appearance] : normal in appearance [Breast Palpation Mass] : no palpable masses [Bowel Sounds] : normal bowel sounds [Abdomen Soft] : soft [Abdomen Tenderness] : non-tender [Abdomen Mass (___ Cm)] : no abdominal mass palpated [FreeTextEntry1] : Mild truncal obesity [Cervical Lymph Nodes Enlarged Posterior Bilaterally] : posterior cervical [Cervical Lymph Nodes Enlarged Anterior Bilaterally] : anterior cervical [Supraclavicular Lymph Nodes Enlarged Bilaterally] : supraclavicular [Axillary Lymph Nodes Enlarged Bilaterally] : axillary [Femoral Lymph Nodes Enlarged Bilaterally] : femoral [Inguinal Lymph Nodes Enlarged Bilaterally] : inguinal [No CVA Tenderness] : no ~M costovertebral angle tenderness [No Spinal Tenderness] : no spinal tenderness [Abnormal Walk] : normal gait [Nail Clubbing] : no clubbing  or cyanosis of the fingernails [Musculoskeletal - Swelling] : no joint swelling seen [Motor Tone] : muscle strength and tone were normal [Skin Color & Pigmentation] : normal skin color and pigmentation [Skin Turgor] : normal skin turgor [] : no rash [Deep Tendon Reflexes (DTR)] : deep tendon reflexes were 2+ and symmetric [Sensation] : the sensory exam was normal to light touch and pinprick [No Focal Deficits] : no focal deficits [Oriented To Time, Place, And Person] : oriented to person, place, and time [Impaired Insight] : insight and judgment were intact [Affect] : the affect was normal

## 2021-06-22 ENCOUNTER — APPOINTMENT (OUTPATIENT)
Dept: THORACIC SURGERY | Facility: CLINIC | Age: 76
End: 2021-06-22

## 2021-07-06 ENCOUNTER — APPOINTMENT (OUTPATIENT)
Dept: THORACIC SURGERY | Facility: CLINIC | Age: 76
End: 2021-07-06
Payer: MEDICARE

## 2021-07-06 VITALS
OXYGEN SATURATION: 95 % | BODY MASS INDEX: 26.92 KG/M2 | TEMPERATURE: 98.4 F | HEART RATE: 63 BPM | RESPIRATION RATE: 16 BRPM | SYSTOLIC BLOOD PRESSURE: 132 MMHG | HEIGHT: 70 IN | WEIGHT: 188 LBS | DIASTOLIC BLOOD PRESSURE: 86 MMHG

## 2021-07-06 DIAGNOSIS — Z23 ENCOUNTER FOR IMMUNIZATION: ICD-10-CM

## 2021-07-06 PROCEDURE — 99214 OFFICE O/P EST MOD 30 MIN: CPT

## 2021-07-06 NOTE — PHYSICAL EXAM
[] : no respiratory distress [Respiration, Rhythm And Depth] : normal respiratory rhythm and effort [Exaggerated Use Of Accessory Muscles For Inspiration] : no accessory muscle use [Auscultation Breath Sounds / Voice Sounds] : lungs were clear to auscultation bilaterally [Heart Rate And Rhythm] : heart rate was normal and rhythm regular [Examination Of The Chest] : the chest was normal in appearance [Chest Visual Inspection Thoracic Asymmetry] : no chest asymmetry [Diminished Respiratory Excursion] : normal chest expansion [Breast Appearance] : normal in appearance [Breast Palpation Mass] : no palpable masses [Oriented To Time, Place, And Person] : oriented to person, place, and time [Impaired Insight] : insight and judgment were intact [Affect] : the affect was normal [Mood] : the mood was normal [Memory Recent] : recent memory was not impaired [Memory Remote] : remote memory was not impaired

## 2021-07-06 NOTE — HISTORY OF PRESENT ILLNESS
[FreeTextEntry1] : 76 year old male,with a history hypertension, hyperlipidemia, COPD, mildly dilated ascending aorta (4.4cm). Incidentally found to have 3.7cm irregular lesion in the lung s/p FB, Right VATS, RUL Lobectomy on 4/19/18, pathology revealed T1c N0 adenocarcinoma. \par \par CT chest on 12/1/20\par - s/p RULobectomy; Unchanged linear scarring seen in the RML and RLL\par - Previously described focal ggo seen in the LLL has resolved\par - Unchanged, 3 mm perifissural nodule again seen in the RENAE (series 4, image 91) \par - Unchanged 2 mm pulmonary nodule seen in RENAE (series 4, image 136)\par - Ascending thoracic aorta measuring 4.4 cm; Unchanged \par - Simple appearing cyst upper pole right kidney measures 4.7 x 4.3 cm\par \par CXR on 3/18/21: No evidence of acute cardiothoracic process; Prior RLL segmental resection with tenting of the hemidiaphragm secondary to volume loss. \par \par Patient presents today for follow up. Endorses worsening SOB with moderate exertion. Endorses recent cardiac workup, which was negative for significant findings.  Denies fever, chills, night sweats, lightheadedness, dizziness or hemoptysis.

## 2021-07-06 NOTE — ASSESSMENT
[FreeTextEntry1] : 76 year old male,with a history hypertension, hyperlipidemia, COPD, mildly dilated ascending aorta (4.4cm). Incidentally found to have 3.7cm irregular lesion in the lung s/p FB, Right VATS, RUL Lobectomy on 4/19/18, pathology revealed T1c N0 adenocarcinoma. \par \par I have independently reviewed the medical records and imaging at the time of this office consultation, and discussed the following interpretations and recommendations with the patient:\par - Refer to Dr. Cedeno for further management and treatment\par - Return to clinic in 3 months with follow up CT Chest, no contrast. \par \par Recommendations reviewed with patient during this office visit, and all questions answered; Patient instructed on the importance of follow up and verbalizes understanding.\par \par I personally performed the services described in the documentation, reviewed the documentation recorded by the scribe in my presence and it accurately and completely records my words and actions.\par \par I, OSIEL Wong-C, am scribing for and the presence of JONEL Beckford, the following sections HISTORY OF PRESENT ILLNESS, PAST MEDICAL/FAMILY/SOCIAL HISTORY; REVIEW OF SYSTEMS; VITAL SIGNS; PHYSICAL EXAM; DISPOSITION.\par \par

## 2021-07-06 NOTE — CONSULT LETTER
[Courtesy Letter:] : I had the pleasure of seeing your patient, [unfilled], in my office today. [Please see my note below.] : Please see my note below. [Sincerely,] : Sincerely, [DrOlimpia  ___] : Dr. DOBSON [Dear  ___] : Dear  [unfilled], [FreeTextEntry2] : Dr. Jackson De La Cruz (PCP)\par Dr. Jono Perez (Pulm)\par Dr. Aidan Johnson (Spine Surgeon)\par Dr. Beatris Machado (Heme/Onc) [FreeTextEntry3] : Seven Rios MD, FACS \par Chief, Division of Thoracic Surgery \par Director, Minimally Invasive Thoracic Surgery \par Department of Cardiovascular and Thoracic Surgery \par [facility] \par , Cardiovascular and Thoracic Surgery\par \par \par

## 2021-07-07 ENCOUNTER — LABORATORY RESULT (OUTPATIENT)
Age: 76
End: 2021-07-07

## 2021-07-07 ENCOUNTER — APPOINTMENT (OUTPATIENT)
Dept: PULMONOLOGY | Facility: CLINIC | Age: 76
End: 2021-07-07
Payer: MEDICARE

## 2021-07-07 VITALS
BODY MASS INDEX: 27.06 KG/M2 | WEIGHT: 189 LBS | HEART RATE: 67 BPM | OXYGEN SATURATION: 96 % | HEIGHT: 70 IN | DIASTOLIC BLOOD PRESSURE: 88 MMHG | SYSTOLIC BLOOD PRESSURE: 159 MMHG

## 2021-07-07 DIAGNOSIS — R06.02 SHORTNESS OF BREATH: ICD-10-CM

## 2021-07-07 PROCEDURE — 99204 OFFICE O/P NEW MOD 45 MIN: CPT

## 2021-07-08 LAB
A1AT SERPL-MCNC: 122 MG/DL
BASOPHILS # BLD AUTO: 0.02 K/UL
BASOPHILS NFR BLD AUTO: 0.3 %
EOSINOPHIL # BLD AUTO: 0.06 K/UL
EOSINOPHIL NFR BLD AUTO: 1 %
HCT VFR BLD CALC: 47.1 %
HGB BLD-MCNC: 15.4 G/DL
IMM GRANULOCYTES NFR BLD AUTO: 0.3 %
LYMPHOCYTES # BLD AUTO: 1.51 K/UL
LYMPHOCYTES NFR BLD AUTO: 24.5 %
MAN DIFF?: NORMAL
MCHC RBC-ENTMCNC: 28.6 PG
MCHC RBC-ENTMCNC: 32.7 GM/DL
MCV RBC AUTO: 87.5 FL
MONOCYTES # BLD AUTO: 0.57 K/UL
MONOCYTES NFR BLD AUTO: 9.3 %
NEUTROPHILS # BLD AUTO: 3.98 K/UL
NEUTROPHILS NFR BLD AUTO: 64.6 %
PLATELET # BLD AUTO: 241 K/UL
RBC # BLD: 5.38 M/UL
RBC # FLD: 13.1 %
SARS-COV-2 N GENE NPH QL NAA+PROBE: NOT DETECTED
WBC # FLD AUTO: 6.16 K/UL

## 2021-07-08 NOTE — PROCEDURE
[FreeTextEntry1] : PFT review shows evidence of obstructive lung disease with varying degree of reversibility

## 2021-07-08 NOTE — HISTORY OF PRESENT ILLNESS
[Never] : never [TextBox_4] : Patient here for evaluation of shortness of breath.  He reports chronic dyspnea since prior lung surgery.  In 2018 he had an incidental lung mass that was discovered for which she underwent right upper lobe lobectomy this revealed adenocarcinoma.  He was found to have obstructive lung disease on PFTs.  He continues to experience dyspnea.  He reports prior treatment with inhalers without benefit.  Lifelong non-smoker.  Denies occupational exposure to dust fumes or toxins.

## 2021-07-08 NOTE — ASSESSMENT
[FreeTextEntry1] : Obstructive lung disease.  Degree of reversibility unclear.  Would evaluate for underlying asthma and for alpha-1 antitrypsin deficiency.\par \par Inhaler trial-follow-up 1 month\par \par Consider steroid trial

## 2021-07-11 LAB
A ALTERNATA IGE QN: <0.1 KUA/L
A ALTERNATA IGE QN: <0.1 KUA/L
A FUMIGATUS IGE QN: <0.1 KUA/L
A FUMIGATUS IGE QN: <0.1 KUA/L
BERMUDA GRASS IGE QN: <0.1 KUA/L
BOXELDER IGE QN: <0.1 KUA/L
C ALBICANS IGE QN: <0.1 KUA/L
C HERBARUM IGE QN: <0.1 KUA/L
C HERBARUM IGE QN: <0.1 KUA/L
CALIF WALNUT IGE QN: <0.1 KUA/L
CAT DANDER IGE QN: <0.1 KUA/L
CAT DANDER IGE QN: <0.1 KUA/L
CLAM IGE QN: <0.1 KUA/L
CMN PIGWEED IGE QN: <0.1 KUA/L
CODFISH IGE QN: <0.1 KUA/L
COMMON RAGWEED IGE QN: <0.1 KUA/L
COMMON RAGWEED IGE QN: <0.1 KUA/L
CORN IGE QN: <0.1 KUA/L
COTTONWOOD IGE QN: <0.1 KUA/L
COW MILK IGE QN: <0.1 KUA/L
D FARINAE IGE QN: <0.1 KUA/L
D FARINAE IGE QN: <0.1 KUA/L
D PTERONYSS IGE QN: <0.1 KUA/L
D PTERONYSS IGE QN: <0.1 KUA/L
DEPRECATED A ALTERNATA IGE RAST QL: 0
DEPRECATED A ALTERNATA IGE RAST QL: 0
DEPRECATED A FUMIGATUS IGE RAST QL: 0
DEPRECATED A FUMIGATUS IGE RAST QL: 0
DEPRECATED BERMUDA GRASS IGE RAST QL: 0
DEPRECATED BOXELDER IGE RAST QL: 0
DEPRECATED C ALBICANS IGE RAST QL: 0
DEPRECATED C HERBARUM IGE RAST QL: 0
DEPRECATED C HERBARUM IGE RAST QL: 0
DEPRECATED CAT DANDER IGE RAST QL: 0
DEPRECATED CAT DANDER IGE RAST QL: 0
DEPRECATED CLAM IGE RAST QL: 0
DEPRECATED CODFISH IGE RAST QL: 0
DEPRECATED COMMON PIGWEED IGE RAST QL: 0
DEPRECATED COMMON RAGWEED IGE RAST QL: 0
DEPRECATED COMMON RAGWEED IGE RAST QL: 0
DEPRECATED CORN IGE RAST QL: 0
DEPRECATED COTTONWOOD IGE RAST QL: 0
DEPRECATED COW MILK IGE RAST QL: 0
DEPRECATED D FARINAE IGE RAST QL: 0
DEPRECATED D FARINAE IGE RAST QL: 0
DEPRECATED D PTERONYSS IGE RAST QL: 0
DEPRECATED D PTERONYSS IGE RAST QL: 0
DEPRECATED DOG DANDER IGE RAST QL: 0
DEPRECATED DOG DANDER IGE RAST QL: 0
DEPRECATED EGG WHITE IGE RAST QL: 0
DEPRECATED GOOSEFOOT IGE RAST QL: 0
DEPRECATED LONDON PLANE IGE RAST QL: 0
DEPRECATED M RACEMOSUS IGE RAST QL: 0
DEPRECATED MOUSE URINE PROT IGE RAST QL: 0
DEPRECATED MUGWORT IGE RAST QL: 0
DEPRECATED P NOTATUM IGE RAST QL: 0
DEPRECATED PEANUT IGE RAST QL: 0
DEPRECATED RED CEDAR IGE RAST QL: 0
DEPRECATED ROACH IGE RAST QL: 0
DEPRECATED ROACH IGE RAST QL: 0
DEPRECATED SCALLOP IGE RAST QL: <0.1 KUA/L
DEPRECATED SESAME SEED IGE RAST QL: 0
DEPRECATED SHEEP SORREL IGE RAST QL: 0
DEPRECATED SHRIMP IGE RAST QL: 0
DEPRECATED SILVER BIRCH IGE RAST QL: 0
DEPRECATED SOYBEAN IGE RAST QL: 0
DEPRECATED TIMOTHY IGE RAST QL: 0
DEPRECATED TIMOTHY IGE RAST QL: 0
DEPRECATED WALNUT IGE RAST QL: 0
DEPRECATED WHEAT IGE RAST QL: 0
DEPRECATED WHITE ASH IGE RAST QL: 0
DEPRECATED WHITE OAK IGE RAST QL: 0
DEPRECATED WHITE OAK IGE RAST QL: 0
DOG DANDER IGE QN: <0.1 KUA/L
DOG DANDER IGE QN: <0.1 KUA/L
EGG WHITE IGE QN: <0.1 KUA/L
GOOSEFOOT IGE QN: <0.1 KUA/L
LONDON PLANE IGE QN: <0.1 KUA/L
M RACEMOSUS IGE QN: <0.1 KUA/L
MOUSE URINE PROT IGE QN: <0.1 KUA/L
MUGWORT IGE QN: <0.1 KUA/L
MULBERRY (T70) CLASS: 0
MULBERRY (T70) CONC: <0.1 KUA/L
P NOTATUM IGE QN: <0.1 KUA/L
PEANUT IGE QN: <0.1 KUA/L
RED CEDAR IGE QN: <0.1 KUA/L
ROACH IGE QN: <0.1 KUA/L
ROACH IGE QN: <0.1 KUA/L
SCALLOP IGE QN: 0
SCALLOP IGE QN: <0.1 KUA/L
SESAME SEED IGE QN: <0.1 KUA/L
SHEEP SORREL IGE QN: <0.1 KUA/L
SILVER BIRCH IGE QN: <0.1 KUA/L
SOYBEAN IGE QN: <0.1 KUA/L
TIMOTHY IGE QN: <0.1 KUA/L
TIMOTHY IGE QN: <0.1 KUA/L
TREE ALLERG MIX1 IGE QL: 0
WALNUT IGE QN: <0.1 KUA/L
WHEAT IGE QN: <0.1 KUA/L
WHITE ASH IGE QN: <0.1 KUA/L
WHITE ELM IGE QN: 0
WHITE ELM IGE QN: <0.1 KUA/L
WHITE OAK IGE QN: <0.1 KUA/L
WHITE OAK IGE QN: <0.1 KUA/L

## 2021-07-12 ENCOUNTER — APPOINTMENT (OUTPATIENT)
Dept: PULMONOLOGY | Facility: CLINIC | Age: 76
End: 2021-07-12
Payer: MEDICARE

## 2021-07-12 PROCEDURE — 94060 EVALUATION OF WHEEZING: CPT

## 2021-07-12 PROCEDURE — 94729 DIFFUSING CAPACITY: CPT

## 2021-07-12 PROCEDURE — 94726 PLETHYSMOGRAPHY LUNG VOLUMES: CPT

## 2021-07-12 PROCEDURE — 95012 NITRIC OXIDE EXP GAS DETER: CPT

## 2021-07-13 ENCOUNTER — APPOINTMENT (OUTPATIENT)
Dept: PULMONOLOGY | Facility: CLINIC | Age: 76
End: 2021-07-13
Payer: MEDICARE

## 2021-07-13 VITALS
TEMPERATURE: 97.7 F | DIASTOLIC BLOOD PRESSURE: 76 MMHG | HEART RATE: 65 BPM | SYSTOLIC BLOOD PRESSURE: 133 MMHG | OXYGEN SATURATION: 94 %

## 2021-07-13 PROCEDURE — 99213 OFFICE O/P EST LOW 20 MIN: CPT

## 2021-07-13 NOTE — PROCEDURE
[FreeTextEntry1] : PFT demonstrates moderate airflow obstruction with borderline positive bronchodilator response

## 2021-07-13 NOTE — ASSESSMENT
[FreeTextEntry1] : Obstructive lung disease on testing unclear if this is asthma or COPD recommend 4-week trial of Trelegy 200 mcg.\par Not as of yet vaccinated for Covid.  Discussed benefits of this and he is refusing

## 2021-07-15 LAB
ANNOTATION COMMENT IMP: NORMAL
ELECTRONIC SIGNATURE: NORMAL
SERPINA1 GENE MUT TESTED BLD/T: NORMAL

## 2021-08-06 DIAGNOSIS — Z01.818 ENCOUNTER FOR OTHER PREPROCEDURAL EXAMINATION: ICD-10-CM

## 2021-08-07 ENCOUNTER — APPOINTMENT (OUTPATIENT)
Dept: DISASTER EMERGENCY | Facility: CLINIC | Age: 76
End: 2021-08-07

## 2021-08-08 LAB — SARS-COV-2 N GENE NPH QL NAA+PROBE: NOT DETECTED

## 2021-08-10 ENCOUNTER — APPOINTMENT (OUTPATIENT)
Dept: PULMONOLOGY | Facility: CLINIC | Age: 76
End: 2021-08-10
Payer: MEDICARE

## 2021-08-10 VITALS
RESPIRATION RATE: 14 BRPM | DIASTOLIC BLOOD PRESSURE: 68 MMHG | TEMPERATURE: 98 F | OXYGEN SATURATION: 96 % | HEART RATE: 65 BPM | WEIGHT: 198 LBS | SYSTOLIC BLOOD PRESSURE: 116 MMHG | BODY MASS INDEX: 28.41 KG/M2

## 2021-08-10 PROCEDURE — ZZZZZ: CPT

## 2021-08-10 PROCEDURE — 94729 DIFFUSING CAPACITY: CPT

## 2021-08-10 PROCEDURE — 99213 OFFICE O/P EST LOW 20 MIN: CPT | Mod: 25

## 2021-08-10 PROCEDURE — 94010 BREATHING CAPACITY TEST: CPT

## 2021-08-10 PROCEDURE — 94727 GAS DIL/WSHOT DETER LNG VOL: CPT

## 2021-08-11 NOTE — PROCEDURE
[FreeTextEntry1] : PFT demonstrates interval improvement with residual airflow limitation no change in diffusing capacity

## 2021-08-11 NOTE — HISTORY OF PRESENT ILLNESS
[Never] : never [TextBox_4] : Follow-up for shortness of breath.\par Here for PFT and review\par Some clinical improvement on inhaler WDL

## 2021-09-01 ENCOUNTER — APPOINTMENT (OUTPATIENT)
Dept: CARDIOLOGY | Facility: CLINIC | Age: 76
End: 2021-09-01

## 2021-11-23 ENCOUNTER — APPOINTMENT (OUTPATIENT)
Dept: THORACIC SURGERY | Facility: CLINIC | Age: 76
End: 2021-11-23
Payer: MEDICARE

## 2021-11-23 VITALS
HEART RATE: 63 BPM | BODY MASS INDEX: 26.63 KG/M2 | DIASTOLIC BLOOD PRESSURE: 79 MMHG | HEIGHT: 70 IN | RESPIRATION RATE: 16 BRPM | SYSTOLIC BLOOD PRESSURE: 136 MMHG | OXYGEN SATURATION: 98 % | WEIGHT: 186 LBS

## 2021-11-23 PROCEDURE — 99214 OFFICE O/P EST MOD 30 MIN: CPT

## 2021-11-23 NOTE — CONSULT LETTER
[Dear  ___] : Dear  [unfilled], [Courtesy Letter:] : I had the pleasure of seeing your patient, [unfilled], in my office today. [Please see my note below.] : Please see my note below. [Sincerely,] : Sincerely, [DrOlimpia  ___] : Dr. DOBSON [FreeTextEntry2] : Dr. Jackson De La Cruz (PCP)\par Dr. Jono Perez (Pulm)\par Dr. Aidan Johnson (Spine Surgeon)\par Dr. Beatris Machado (Heme/Onc) [FreeTextEntry3] : Seven Rios MD, FACS \par Chief, Division of Thoracic Surgery \par Director, Minimally Invasive Thoracic Surgery \par Department of Cardiovascular and Thoracic Surgery \par [facility] \par , Cardiovascular and Thoracic Surgery\par \par \par

## 2021-11-23 NOTE — ASSESSMENT
[FreeTextEntry1] : 76 year old male,with a history hypertension, hyperlipidemia, COPD, mildly dilated ascending aorta (4.4cm). Incidentally found to have 3.7cm irregular lesion in the lung s/p FB, Right VATS, RUL Lobectomy on 4/19/18, pathology revealed T1c N0 adenocarcinoma. \par \par CT chest on 12/1/20\par - s/p RULobectomy; Unchanged linear scarring seen in the RML and RLL\par - Previously described focal ggo seen in the LLL has resolved\par - Unchanged, 3 mm perifissural nodule again seen in the RENAE (series 4, image 91) \par - Unchanged 2 mm pulmonary nodule seen in RENAE (series 4, image 136)\par - Ascending thoracic aorta measuring 4.4 cm; Unchanged \par - Simple appearing cyst upper pole right kidney measures 4.7 x 4.3 cm\par \par CXR on 3/18/21: No evidence of acute cardiothoracic process; Prior RLL segmental resection with tenting of the hemidiaphragm secondary to volume loss. \par \par CT chest on 11/01/2021:\par - Again noted are emphysema, and changes S/P right upper lobectomy, without evidence of nodule or infiltrate.\par \par F/u with Dr. Cedeno for COPD/asthma. \par \par I have reviewed the patient's medical records and diagnostic images at time of this office consultation and have made the following recommendation:\par 1. CT chest reviewed and explained to patient, I recommended patient to return to office in 6 months with CT Chest without contrast. 	\par 2. F/u with Dr. Cedeno. \par \par I personally performed the services described in the documentation, reviewed the documentation recorded by the scribe in my presence and it accurately and completely records my words and actions.\par \par I, Meri Bynum, SYMONE, am scribing for and the presence of JONEL Beckford, the following sections HISTORY OF PRESENT ILLNESS, PAST MEDICAL/FAMILY/SOCIAL HISTORY; REVIEW OF SYSTEMS; VITAL SIGNS; PHYSICAL EXAM; DISPOSITION.

## 2021-11-23 NOTE — HISTORY OF PRESENT ILLNESS
[FreeTextEntry1] : 76 year old male,with a history hypertension, hyperlipidemia, COPD, mildly dilated ascending aorta (4.4cm). Incidentally found to have 3.7cm irregular lesion in the lung s/p FB, Right VATS, RUL Lobectomy on 4/19/18, pathology revealed T1c N0 adenocarcinoma. \par \par CT chest on 12/1/20\par - s/p RULobectomy; Unchanged linear scarring seen in the RML and RLL\par - Previously described focal ggo seen in the LLL has resolved\par - Unchanged, 3 mm perifissural nodule again seen in the RENAE (series 4, image 91) \par - Unchanged 2 mm pulmonary nodule seen in RENAE (series 4, image 136)\par - Ascending thoracic aorta measuring 4.4 cm; Unchanged \par - Simple appearing cyst upper pole right kidney measures 4.7 x 4.3 cm\par \par CXR on 3/18/21: No evidence of acute cardiothoracic process; Prior RLL segmental resection with tenting of the hemidiaphragm secondary to volume loss. \par \par CT chest on 11/01/2021:\par - Again noted are emphysema, and changes S/P right upper lobectomy, without evidence of nodule or infiltrate.\par \par F/u with Dr. Cedeno for COPD/asthma. \par \par Patient presents today for follow up. Patient c/o SOB on exertion, denies cough, chest pain, fever, chills.

## 2022-04-12 ENCOUNTER — APPOINTMENT (OUTPATIENT)
Dept: ORTHOPEDIC SURGERY | Facility: CLINIC | Age: 77
End: 2022-04-12
Payer: MEDICARE

## 2022-04-12 VITALS — BODY MASS INDEX: 26.63 KG/M2 | HEIGHT: 70 IN | WEIGHT: 186 LBS

## 2022-04-12 DIAGNOSIS — M50.20 OTHER CERVICAL DISC DISPLACEMENT, UNSPECIFIED CERVICAL REGION: ICD-10-CM

## 2022-04-12 DIAGNOSIS — Z98.890 OTHER SPECIFIED POSTPROCEDURAL STATES: ICD-10-CM

## 2022-04-12 PROCEDURE — 99213 OFFICE O/P EST LOW 20 MIN: CPT

## 2022-04-12 NOTE — HISTORY OF PRESENT ILLNESS
[Upper back] : upper back [5] : 5 [3] : 3 [de-identified] : C MRI 1/17/22 ZP-\par At C2-3: Minimal central disc protrusion without significant spinal canal or neural foraminal stenosis.\par At C3-4: Shallow central disc protrusion without significant spinal canal stenosis. There is right facet arthropathy and moderate right neuroforaminal\par stenosis.\par At C4-5: There is disc osteophyte complex mildly narrowing the canal. There is left facet arthropathy.\par At C5-6: There is disc osteophyte complex causing spinal canal stenosis with cord compression. There is no cord signal abnormality. There is severe left and\par moderate right neuroforaminal stenosis.\par At C6-7: There is disc osteophyte complex mildly narrowing the spinal canal without compression the spinal cord. There is severe bilateral neural\par foraminal stenosis.\par At C7-T1: There is facet arthropathy and trace anterolisthesis without significant spinal canal or neural foraminal stenosis.\par Ind. review- b/l foraminal narrowing C5/6, C6/7 severe, effacement of epidural space\par \par 12/16/21- Mr. Salazar Palencia, a 76-year-old male, presents today for left shoulder and left arm. Reports left shoulder pain which can radiate throughout the LUE, occasional n/t. Also left sided neck pain. \par L shoulder XRs 12/16/21- No sig. findings, mild Degen changes\par \par 1/6/22- Continued neck pain with pain radiating down the LUE through scapula, to arm, forearm. A/w N/T. \par Minimal issues with small objects, does not use assistive device, does not require handrails up/down stairs.\par 2/4/22- MRI f/u\par 4/12/22- Pain has improved. Has been doing PT X 2 weeks. Reports pain has started to radiate to right side of chest following a fall 2 weeks ago while ice skating and landing on his R side/back. Was seen at [facility] the next day; no fractures were seen. Patient states he has a hx of lung cancer operation in this region in 2018.  No radic in LUE.  [FreeTextEntry1] : chest [de-identified] : PT

## 2022-04-12 NOTE — ASSESSMENT
[FreeTextEntry1] : b/l foraminal narrowing C5/6, C6/7 severe, effacement of epidural space\par PT\par Will discuss Pain\par \par H/o L4/5 lami and his spondy, has ? claudication symptoms, will discuss updated MRI

## 2022-04-12 NOTE — PHYSICAL EXAM
[FreeTextEntry3] : Inspection of the cervical spine is as follows: no ecchymosis. Palpation of the cervical spine is as follows: no trapezial/rhomboid,paracervical tenderness. no trapezial spasm, no rhomboid spasm and no paracervical spasm. Range of motion of the cervical spine is as follows: full ROM with mild stiffness. Strength Testing for the cervical spine is as follows: Left Deltoid strength 5/5, Right Deltoid strength 5/5, Left Biceps 5/5, Right Biceps 5/5, Left Triceps 5/5, Right Triceps 5/5, Left Wrist Flexors 5/5, Right Wrist Flexors 5/5, Left Finger Abductors 5/5, Right Finger Abductors 5/5, Left Grasp 5/5 and Right Grasp 5/5 Neurological testing for the cervical spine is as follows: Deep tendon reflexes LEFT reveals Biceps Reflex Diminished and brachioradialis Reflex Diminished. Deep tendon reflexes RIGHT reveals biceps reflex diminished and brachioradialis reflex diminished. light touch is intact throughout both upper extremities and negative Mulligan reflex \par \par Back, including spine: Gait and function is as follows: Able to tandem gait

## 2022-05-10 ENCOUNTER — APPOINTMENT (OUTPATIENT)
Dept: GASTROENTEROLOGY | Facility: CLINIC | Age: 77
End: 2022-05-10
Payer: MEDICARE

## 2022-05-10 VITALS
TEMPERATURE: 97.3 F | SYSTOLIC BLOOD PRESSURE: 132 MMHG | WEIGHT: 186 LBS | HEIGHT: 70 IN | BODY MASS INDEX: 26.63 KG/M2 | DIASTOLIC BLOOD PRESSURE: 78 MMHG | HEART RATE: 64 BPM | OXYGEN SATURATION: 96 %

## 2022-05-10 DIAGNOSIS — R14.0 ABDOMINAL DISTENSION (GASEOUS): ICD-10-CM

## 2022-05-10 DIAGNOSIS — R94.39 ABNORMAL RESULT OF OTHER CARDIOVASCULAR FUNCTION STUDY: ICD-10-CM

## 2022-05-10 DIAGNOSIS — Z86.010 PERSONAL HISTORY OF COLONIC POLYPS: ICD-10-CM

## 2022-05-10 DIAGNOSIS — R06.00 DYSPNEA, UNSPECIFIED: ICD-10-CM

## 2022-05-10 PROCEDURE — 99214 OFFICE O/P EST MOD 30 MIN: CPT

## 2022-05-10 NOTE — ASSESSMENT
[FreeTextEntry1] : Impression: No current GI issues.  Due for colonoscopy 2025.  Patient may wish to defer further since his last colonoscopy was normal.  Coronary artery disease without any symptoms of angina.  Dyspnea on exertion most likely secondary to COPD.\par \par Plan: Patient advised to follow-up with cardiology and pulmonology and follow their recommendations.

## 2022-05-10 NOTE — PHYSICAL EXAM
[General Appearance - Alert] : alert [General Appearance - In No Acute Distress] : in no acute distress [Neck Appearance] : the appearance of the neck was normal [Neck Cervical Mass (___cm)] : no neck mass was observed [Jugular Venous Distention Increased] : there was no jugular-venous distention [Thyroid Diffuse Enlargement] : the thyroid was not enlarged [Thyroid Nodule] : there were no palpable thyroid nodules [Auscultation Breath Sounds / Voice Sounds] : lungs were clear to auscultation bilaterally [Heart Rate And Rhythm] : heart rate was normal and rhythm regular [Heart Sounds] : normal S1 and S2 [Heart Sounds Gallop] : no gallops [Murmurs] : no murmurs [Heart Sounds Pericardial Friction Rub] : no pericardial rub [Full Pulse] : the pedal pulses are present [Edema] : there was no peripheral edema [Bowel Sounds] : normal bowel sounds [Abdomen Soft] : soft [Abdomen Tenderness] : non-tender [] : no hepato-splenomegaly [Abdomen Mass (___ Cm)] : no abdominal mass palpated [No CVA Tenderness] : no ~M costovertebral angle tenderness [No Spinal Tenderness] : no spinal tenderness

## 2022-05-10 NOTE — HISTORY OF PRESENT ILLNESS
[Heartburn] : denies heartburn [Nausea] : denies nausea [Vomiting] : denies vomiting [Diarrhea] : denies diarrhea [Constipation] : denies constipation [Yellow Skin Or Eyes (Jaundice)] : denies jaundice [Abdominal Pain] : denies abdominal pain [Abdominal Swelling] : denies abdominal swelling [Rectal Pain] : denies rectal pain [GERD] : gastroesophageal reflux disease [Peptic Ulcer Disease] : no peptic ulcer disease [Pancreatitis] : pancreatitis [Cholelithiasis] : cholelithiasis [Kidney Stone] : no kidney stone [Inflammatory Bowel Disease] : no inflammatory bowel disease [Irritable Bowel Syndrome] : no irritable bowel syndrome [Diverticulitis] : no diverticulitis [Alcohol Abuse] : no alcohol abuse [Malignancy] : malignancy [Abdominal Surgery] : abdominal surgery [Cholecystectomy] : cholecystectomy [de-identified] : Patient has no new complaints.  Abdominal discomfort for which she saw Dr. De La Cruz last year has resolved.  Bowel habits are normal.  Last colonoscopy in 2018 which was normal.  History of polyps on prior exam so a 5-year follow-up (2023) is recommended.  Patient's main complaint is dyspnea on exertion.  He is status post right upper lung lobectomy in 2018 for cancer.  He also has coronary artery disease and some ischemia on a stress test.  Has not seen his cardiologist in about a year.  Had COVID-19 in March 2021.  Recent CT of the chest shows mild emphysematous changes and postop changes in the right upper lobe.  Past medical history of ischiorectal abscess drained by colorectal surgery.  Also spinal stenosis L4-L5.  Is not taking any inhalers or bronchodilators.  On atenolol and olmesartan for hypertension.

## 2022-05-17 ENCOUNTER — APPOINTMENT (OUTPATIENT)
Dept: PULMONOLOGY | Facility: CLINIC | Age: 77
End: 2022-05-17

## 2022-06-01 ENCOUNTER — APPOINTMENT (OUTPATIENT)
Dept: PULMONOLOGY | Facility: CLINIC | Age: 77
End: 2022-06-01
Payer: MEDICARE

## 2022-06-01 ENCOUNTER — NON-APPOINTMENT (OUTPATIENT)
Age: 77
End: 2022-06-01

## 2022-06-01 VITALS
OXYGEN SATURATION: 97 % | WEIGHT: 186 LBS | SYSTOLIC BLOOD PRESSURE: 152 MMHG | BODY MASS INDEX: 26.63 KG/M2 | DIASTOLIC BLOOD PRESSURE: 83 MMHG | HEART RATE: 68 BPM | HEIGHT: 70 IN

## 2022-06-01 DIAGNOSIS — J44.9 CHRONIC OBSTRUCTIVE PULMONARY DISEASE, UNSPECIFIED: ICD-10-CM

## 2022-06-01 PROCEDURE — 94060 EVALUATION OF WHEEZING: CPT

## 2022-06-01 PROCEDURE — 95012 NITRIC OXIDE EXP GAS DETER: CPT

## 2022-06-01 PROCEDURE — 99214 OFFICE O/P EST MOD 30 MIN: CPT | Mod: 25

## 2022-06-01 PROCEDURE — 94729 DIFFUSING CAPACITY: CPT

## 2022-06-01 PROCEDURE — 94727 GAS DIL/WSHOT DETER LNG VOL: CPT

## 2022-06-01 RX ORDER — FLUTICASONE FUROATE, UMECLIDINIUM BROMIDE AND VILANTEROL TRIFENATATE 200; 62.5; 25 UG/1; UG/1; UG/1
200-62.5-25 POWDER RESPIRATORY (INHALATION)
Qty: 1 | Refills: 5 | Status: DISCONTINUED | OUTPATIENT
Start: 2021-07-13 | End: 2022-06-01

## 2022-06-01 NOTE — ASSESSMENT
[FreeTextEntry1] : Asthma COPD overlap syndrome.  Need to assess reversibility.  Course of oral steroids plus inhaler follow-up in 3 weeks.  May need trial of reflux therapy for throat symptoms if these do not improve with inhaler and oral steroids

## 2022-06-01 NOTE — HISTORY OF PRESENT ILLNESS
[TextBox_4] : Continues to note foamy cough and throat.  Does not describe shortness of breath or wheezing.

## 2022-06-13 DIAGNOSIS — K21.9 GASTRO-ESOPHAGEAL REFLUX DISEASE W/OUT ESOPHAGITIS: ICD-10-CM

## 2022-06-28 ENCOUNTER — APPOINTMENT (OUTPATIENT)
Dept: THORACIC SURGERY | Facility: CLINIC | Age: 77
End: 2022-06-28

## 2022-06-29 ENCOUNTER — APPOINTMENT (OUTPATIENT)
Dept: PULMONOLOGY | Facility: CLINIC | Age: 77
End: 2022-06-29
Payer: MEDICARE

## 2022-06-29 ENCOUNTER — NON-APPOINTMENT (OUTPATIENT)
Age: 77
End: 2022-06-29

## 2022-06-29 ENCOUNTER — APPOINTMENT (OUTPATIENT)
Dept: PULMONOLOGY | Facility: CLINIC | Age: 77
End: 2022-06-29

## 2022-06-29 VITALS
WEIGHT: 180 LBS | HEART RATE: 63 BPM | OXYGEN SATURATION: 95 % | DIASTOLIC BLOOD PRESSURE: 86 MMHG | BODY MASS INDEX: 25.77 KG/M2 | HEIGHT: 70 IN | SYSTOLIC BLOOD PRESSURE: 141 MMHG

## 2022-06-29 PROCEDURE — 99213 OFFICE O/P EST LOW 20 MIN: CPT | Mod: 25

## 2022-06-29 PROCEDURE — 94010 BREATHING CAPACITY TEST: CPT

## 2022-06-29 PROCEDURE — ZZZZZ: CPT

## 2022-06-29 RX ORDER — PREDNISONE 10 MG/1
10 TABLET ORAL
Qty: 30 | Refills: 0 | Status: DISCONTINUED | COMMUNITY
Start: 2022-06-01 | End: 2022-06-29

## 2022-06-29 NOTE — HISTORY OF PRESENT ILLNESS
[TextBox_4] : Follow-up for shortness of breath wheezing and foamy cough.  Feeling better on Breztri inhaler.  Was prescribed PPI as well.  Here for follow-up.

## 2022-06-29 NOTE — ASSESSMENT
[FreeTextEntry1] : Asthma COPD overlap syndrome.  Appears to have significant reversibility.  Recommend continuing with Breztri.  Unclear if patient will be able to maintain improvement without systemic steroids.  If there is further decline in lung function would consider Biologics.  Follow-up spirometry 1 month.

## 2022-07-13 NOTE — REASON FOR VISIT
[FreeTextEntry1] : 89 yo with cystocele, prior pessary placement but fell out after last visit. Pt currently does not have symptoms. \par -We discussed replacement of pessary vs expectant management given no symptoms at present. She would like to continue without pessary.\par -Plan to return if symptoms recur, discussed needs larger size and possible donut/gelhorn [Follow-Up: _____] : a [unfilled] follow-up visit

## 2022-07-18 ENCOUNTER — NON-APPOINTMENT (OUTPATIENT)
Age: 77
End: 2022-07-18

## 2022-07-28 ENCOUNTER — APPOINTMENT (OUTPATIENT)
Dept: ORTHOPEDIC SURGERY | Facility: CLINIC | Age: 77
End: 2022-07-28

## 2022-07-28 VITALS — BODY MASS INDEX: 25.77 KG/M2 | HEIGHT: 70 IN | WEIGHT: 180 LBS

## 2022-07-28 PROCEDURE — 99203 OFFICE O/P NEW LOW 30 MIN: CPT

## 2022-07-28 PROCEDURE — 73562 X-RAY EXAM OF KNEE 3: CPT | Mod: 50

## 2022-07-28 PROCEDURE — 99213 OFFICE O/P EST LOW 20 MIN: CPT

## 2022-07-28 NOTE — ASSESSMENT
[FreeTextEntry1] : XR reviewed with PF OA. DIscussed PT vs HA. Patient elected to start in PT. FU 6 weeks.

## 2022-07-28 NOTE — IMAGING
[Bilateral] : knee bilaterally [AP] : anteroposterior [Lateral] : lateral [[place]] : skyline [Moderate patellofemoral OA] : Moderate patellofemoral OA [de-identified] : B knee\par no swelling, or effusion\par no tenderness to palpation. Creptius with ROM\par Strength 5/5\par Ligament stable\par Neuro intact\par Ambulates without assistance\par

## 2022-07-28 NOTE — HISTORY OF PRESENT ILLNESS
[Gradual] : gradual [Intermittent] : intermittent [Walking] : walking [de-identified] : 77M here with bilateral knee "popping and clicking" for the past 2-3 weeks. No injury or trauma. he denies pain. L knee is worse than the right. No prior knee issues.  [] : no [FreeTextEntry1] : bilateral knees  [FreeTextEntry4] : 2-3 weeks [FreeTextEntry5] : no injury. pt states he started hearing popping and clicking in knees but has no pain

## 2022-08-03 ENCOUNTER — APPOINTMENT (OUTPATIENT)
Dept: PULMONOLOGY | Facility: CLINIC | Age: 77
End: 2022-08-03

## 2022-08-03 VITALS
HEART RATE: 66 BPM | HEIGHT: 70 IN | SYSTOLIC BLOOD PRESSURE: 147 MMHG | DIASTOLIC BLOOD PRESSURE: 90 MMHG | WEIGHT: 180 LBS | BODY MASS INDEX: 25.77 KG/M2 | OXYGEN SATURATION: 97 %

## 2022-08-03 PROCEDURE — 95012 NITRIC OXIDE EXP GAS DETER: CPT

## 2022-08-03 PROCEDURE — 94010 BREATHING CAPACITY TEST: CPT

## 2022-08-03 PROCEDURE — ZZZZZ: CPT

## 2022-08-03 PROCEDURE — 94729 DIFFUSING CAPACITY: CPT

## 2022-08-03 PROCEDURE — 94726 PLETHYSMOGRAPHY LUNG VOLUMES: CPT

## 2022-08-03 PROCEDURE — 99213 OFFICE O/P EST LOW 20 MIN: CPT | Mod: 25

## 2022-08-03 RX ORDER — BUDESONIDE, GLYCOPYRROLATE, AND FORMOTEROL FUMARATE 160; 9; 4.8 UG/1; UG/1; UG/1
160-9-4.8 AEROSOL, METERED RESPIRATORY (INHALATION)
Qty: 1 | Refills: 5 | Status: DISCONTINUED | COMMUNITY
Start: 2022-06-01 | End: 2022-08-03

## 2022-08-03 NOTE — HISTORY OF PRESENT ILLNESS
[TextBox_4] : Follow-up for shortness of breath wheezing and foamy cough.  Currently on Trelegy 200 inhaler feels reasonably well.  Wants to come off medication.

## 2022-08-03 NOTE — ASSESSMENT
[FreeTextEntry1] : Asthma COPD overlap syndrome.  Likely has achieved maximal reversibility recommend continuing with Trelegy can reduce to 100 mcg dose samples provided\par \par Refused pneumonia vaccine

## 2022-09-02 ENCOUNTER — APPOINTMENT (OUTPATIENT)
Dept: ORTHOPEDIC SURGERY | Facility: CLINIC | Age: 77
End: 2022-09-02

## 2022-09-02 VITALS — BODY MASS INDEX: 25.77 KG/M2 | WEIGHT: 180 LBS | HEIGHT: 70 IN

## 2022-09-02 DIAGNOSIS — M17.12 UNILATERAL PRIMARY OSTEOARTHRITIS, LEFT KNEE: ICD-10-CM

## 2022-09-02 DIAGNOSIS — M17.11 UNILATERAL PRIMARY OSTEOARTHRITIS, RIGHT KNEE: ICD-10-CM

## 2022-09-02 PROCEDURE — 99213 OFFICE O/P EST LOW 20 MIN: CPT

## 2022-09-02 NOTE — IMAGING
[Bilateral] : knee bilaterally [AP] : anteroposterior [Lateral] : lateral [[place]] : skyline [Moderate patellofemoral OA] : Moderate patellofemoral OA [de-identified] : B knee\par no swelling, or effusion\par no tenderness to palpation. Creptius with ROM\par Strength 5/5\par Ligament stable\par Neuro intact\par Ambulates without assistance\par

## 2022-09-02 NOTE — HISTORY OF PRESENT ILLNESS
[Gradual] : gradual [0] : 0 [Retired] : Work status: retired [de-identified] : doing well. in PT. still some popping, but feels better and stronger. \par \par ------------------------------------------------------------------------------------------------------------------------------------------------------\par \par 77M here with bilateral knee "popping and clicking" for the past 2-3 weeks. No injury or trauma. he denies pain. L knee is worse than the right. No prior knee issues.  [Intermittent] : intermittent [Walking] : walking [] : no [FreeTextEntry1] : bilateral knees  [FreeTextEntry4] : 2-3 weeks [FreeTextEntry5] : no injury. pt states he started hearing popping and clicking in knees but has no pain

## 2022-09-02 NOTE — DISCUSSION/SUMMARY
[de-identified] : continue PT for now. \par \par ------------------------------------------------------------------------------------------------------------------------------------------------------\par \par The patient was advised of the diagnosis.  The natural history of the pathology was explained in full. All questions were answered.  The risks and benefits of conservative and interventional treatment alternatives were explained to the patient\par \par

## 2022-10-28 ENCOUNTER — NON-APPOINTMENT (OUTPATIENT)
Age: 77
End: 2022-10-28

## 2022-10-28 PROBLEM — I77.810 DILATED AORTIC ROOT: Status: ACTIVE | Noted: 2018-01-03

## 2022-11-01 ENCOUNTER — APPOINTMENT (OUTPATIENT)
Dept: THORACIC SURGERY | Facility: CLINIC | Age: 77
End: 2022-11-01

## 2022-11-01 VITALS
HEART RATE: 67 BPM | HEIGHT: 70 IN | OXYGEN SATURATION: 96 % | BODY MASS INDEX: 26.92 KG/M2 | RESPIRATION RATE: 16 BRPM | DIASTOLIC BLOOD PRESSURE: 75 MMHG | WEIGHT: 188 LBS | SYSTOLIC BLOOD PRESSURE: 130 MMHG

## 2022-11-01 DIAGNOSIS — I77.810 THORACIC AORTIC ECTASIA: ICD-10-CM

## 2022-11-01 PROCEDURE — 99214 OFFICE O/P EST MOD 30 MIN: CPT

## 2022-11-01 NOTE — ASSESSMENT
[FreeTextEntry1] : 77 year old male,with a history hypertension, hyperlipidemia, COPD, mildly dilated ascending aorta (4.4cm). Incidentally found to have 3.7cm irregular lesion in the lung s/p FB, Right VATS, RUL Lobectomy on 4/19/18, pathology revealed T1c N0 adenocarcinoma. \par \par I have reviewed the patient's medical records and diagnostic images at time of this office consultation and have made the following recommendation:\par 1. CT chest reviewed and explained to patient, I recommended patient to return to office in 6 months with CXR. \par \par I personally performed the services described in the documentation, reviewed the documentation recorded by the scribe in my presence and it accurately and completely records my words and actions.\par \par I, Meri Bynum NP, am scribing for and the presence of JONEL Beckford, the following sections HISTORY OF PRESENT ILLNESS, PAST MEDICAL/FAMILY/SOCIAL HISTORY; REVIEW OF SYSTEMS; VITAL SIGNS; PHYSICAL EXAM; DISPOSITION.

## 2022-11-01 NOTE — HISTORY OF PRESENT ILLNESS
[FreeTextEntry1] : 77 year old male,with a history hypertension, hyperlipidemia, COPD, mildly dilated ascending aorta (4.4 cm). Incidentally found to have 3.7cm irregular lesion in the lung s/p FB, Right VATS, RUL Lobectomy on 4/19/18, pathology revealed T1c N0 adenocarcinoma. \par \par CT chest on 12/1/20\par - s/p RULobectomy; Unchanged linear scarring seen in the RML and RLL\par - Previously described focal ggo seen in the LLL has resolved\par - Unchanged, 3 mm perifissural nodule again seen in the RENAE (series 4, image 91) \par - Unchanged 2 mm pulmonary nodule seen in RENAE (series 4, image 136)\par - Ascending thoracic aorta measuring 4.4 cm; Unchanged \par - Simple appearing cyst upper pole right kidney measures 4.7 x 4.3 cm\par \par CXR on 3/18/21: No evidence of acute cardiothoracic process; Prior RLL segmental resection with tenting of the hemidiaphragm secondary to volume loss. \par \par CT chest on 11/01/2021:\par - Again noted are emphysema, and changes S/P right upper lobectomy, without evidence of nodule or infiltrate.\par \par F/u with Dr. Cedeno for COPD/asthma. \par \par CT chest on 10/26/2022: \par - No suspicious pulmonary nodule. No acute cardiothoracic process.\par - Prior right upper lobectomy with emphysema and scarring, unchanged.\par - Dense coronary calcifications with a prominent ascending aorta that is unchanged and measures 4.5 cm.\par - Worsened sclerosis at C6-7 presumed to be degenerative in nature and are better characterized on comparison MR cervical spine.\par \par Patient presents today for follow up. Patient c/o SOB on exertion, denies cough, chest pain, fever, chills.

## 2022-11-01 NOTE — CONSULT LETTER
[Dear  ___] : Dear  [unfilled], [Courtesy Letter:] : I had the pleasure of seeing your patient, [unfilled], in my office today. [Please see my note below.] : Please see my note below. [Sincerely,] : Sincerely, [DrOlimpia  ___] : Dr. DOBSON [FreeTextEntry2] : Dr. Jackson De La Cruz (PCP)\par Dr. Jono Perez (Pulm)\par Dr. Aidan Johnson (Spine Surgeon)\par Dr. Beatris Machado (Heme/Onc) [FreeTextEntry3] : Seven Rios MD, FACS \par Chief, Division of Thoracic Surgery \par Director, Minimally Invasive Thoracic Surgery \par Department of Cardiovascular and Thoracic Surgery \par [facility] \par , Cardiovascular and Thoracic Surgery\par \par \par

## 2022-11-29 ENCOUNTER — RESULT CHARGE (OUTPATIENT)
Age: 77
End: 2022-11-29

## 2022-11-30 ENCOUNTER — NON-APPOINTMENT (OUTPATIENT)
Age: 77
End: 2022-11-30

## 2022-11-30 ENCOUNTER — APPOINTMENT (OUTPATIENT)
Dept: CARDIOLOGY | Facility: CLINIC | Age: 77
End: 2022-11-30

## 2022-11-30 VITALS
OXYGEN SATURATION: 95 % | DIASTOLIC BLOOD PRESSURE: 88 MMHG | HEIGHT: 70 IN | WEIGHT: 198 LBS | HEART RATE: 74 BPM | BODY MASS INDEX: 28.35 KG/M2 | SYSTOLIC BLOOD PRESSURE: 162 MMHG

## 2022-11-30 VITALS — SYSTOLIC BLOOD PRESSURE: 140 MMHG | DIASTOLIC BLOOD PRESSURE: 78 MMHG

## 2022-11-30 DIAGNOSIS — R06.00 DYSPNEA, UNSPECIFIED: ICD-10-CM

## 2022-11-30 DIAGNOSIS — I10 ESSENTIAL (PRIMARY) HYPERTENSION: ICD-10-CM

## 2022-11-30 PROCEDURE — 93000 ELECTROCARDIOGRAM COMPLETE: CPT

## 2022-11-30 PROCEDURE — 99214 OFFICE O/P EST MOD 30 MIN: CPT

## 2022-12-08 ENCOUNTER — APPOINTMENT (OUTPATIENT)
Dept: CT IMAGING | Facility: CLINIC | Age: 77
End: 2022-12-08

## 2022-12-08 NOTE — PHYSICAL EXAM
[General Appearance - Well Developed] : well developed [Normal Appearance] : normal appearance [Well Groomed] : well groomed [General Appearance - Well Nourished] : well nourished [No Deformities] : no deformities [General Appearance - In No Acute Distress] : no acute distress [Normal Conjunctiva] : the conjunctiva exhibited no abnormalities [Eyelids - No Xanthelasma] : the eyelids demonstrated no xanthelasmas [Normal Oral Mucosa] : normal oral mucosa [No Oral Pallor] : no oral pallor [No Oral Cyanosis] : no oral cyanosis [Normal Jugular Venous A Waves Present] : normal jugular venous A waves present [Normal Jugular Venous V Waves Present] : normal jugular venous V waves present [No Jugular Venous Sánchez A Waves] : no jugular venous sánchez A waves [Respiration, Rhythm And Depth] : normal respiratory rhythm and effort [Exaggerated Use Of Accessory Muscles For Inspiration] : no accessory muscle use [Auscultation Breath Sounds / Voice Sounds] : lungs were clear to auscultation bilaterally [Abdomen Soft] : soft [Abdomen Tenderness] : non-tender [Abdomen Mass (___ Cm)] : no abdominal mass palpated [Abnormal Walk] : normal gait [Gait - Sufficient For Exercise Testing] : the gait was sufficient for exercise testing [Nail Clubbing] : no clubbing of the fingernails [Cyanosis, Localized] : no localized cyanosis [Petechial Hemorrhages (___cm)] : no petechial hemorrhages [Skin Color & Pigmentation] : normal skin color and pigmentation [] : no rash [No Venous Stasis] : no venous stasis [Skin Lesions] : no skin lesions [No Skin Ulcers] : no skin ulcer [No Xanthoma] : no  xanthoma was observed [Oriented To Time, Place, And Person] : oriented to person, place, and time [Normal Rate] : normal [No Gallop] : no gallop heard [2+] : left 2+ [Well Developed] : well developed [Normal S1, S2] : normal S1, S2 [Rhythm Regular] : regular [Normal S1] : normal S1 [Normal S2] : normal S2 [No Murmur] : no murmurs heard [No Pitting Edema] : no pitting edema present [No Abnormalities] : the abdominal aorta was not enlarged and no bruit was heard [Clear Lung Fields] : clear lung fields [Good Air Entry] : good air entry [Soft] : abdomen soft [Normal Gait] : normal gait [No Edema] : no edema [No Rash] : no rash [Moves all extremities] : moves all extremities [FreeTextEntry1] : anxious [Right Carotid Bruit] : no bruit heard over the right carotid [Left Carotid Bruit] : no bruit heard over the left carotid [Bruit] : no bruit heard

## 2022-12-08 NOTE — DISCUSSION/SUMMARY
[FreeTextEntry1] : 77  year old man with a history of hypertension presents for a followup cardiac evaluation. \par Salazar is in no acute distress.  He is euvolemic on exam.  His EKG did not reveal any significant ischemic changes, sinus rhythm with LAD.  His blood pressure is on the higher side of normal, likely reactive given his anxiety and pain.\par Mr Palencia is very concerned about his symptom of persistent dyspnea.\par  He had a plain stress test on 10/16/19 with excellent exercise tolerance without any anginal symptoms and without EKG changes.  Given the most recent findings of coronary calcifications on the CT scan of his chest, he will get   640 slice cardiac CT to define his coronary anatomy and to rule out significant CAD. \par His CT scan in 10/2022 showed the Ascending aorta at 4.5cm (unchanged)\par In light of coronary calcifications I have advised that he start statin therapy however he prefers to wait for results of the coronary CT.  His most recent LDL was 92.  Goal LDL is less than 100 ideally less than 70.\par His blood pressure is is not optimal today however I advised that he continue to monitor for now.  He will continue his Atenolol 100mg Qday and olmesartan 40mg Qday.  Once his pain is under control, he will assess for an improvement in his blood pressure readings at home.  I have advised that he check his blood pressure daily in the meantime.  If his blood pressure is seemingly trending up, he knows we will need to increase or add an additional regimen.\par \par He will followup with pulmonary for his COPD which is the main culprit for his dyspnea. \par I will call him with results of the cardiac CT.\par \par Exercise and diet counseling was performed in order to reduce her future cardiovascular risk. \par If all is well, he will followup with me in 6 months or sooner if necessary. \par

## 2022-12-08 NOTE — HISTORY OF PRESENT ILLNESS
[FreeTextEntry1] : 77  year old man with a history hypertension, hyperlipidemia, mildly dilated ascending aorta (4.4cm). He incidentally was found to have 3.7cm irregular lesion in the lung  s/p FB, Right VATS, RUL Lobectomy on 4/19/18, pathology revealed adenocarcinoma,  COPD.\par \par He  saw a cardiologist at [facility] in 2019.. He notes that he had a stress test in 7/2019 at [facility] that showed defect apical inferior and mid inferior wall that was reversible with normal wall motion. The echo done at [facility] in 7/2019 showed normal LV function with a mild ascending aorta 4.15.  \par He had a plain stress test on 10/16/19 with excellent exercise tolerance without any anginal symptoms and without EKG changes.\par \par Since his last visit  he is still complaining of worsening MUNOZ when climbing stairs and when walking on an incline. \par  He   denies any chest pain, dyspnea, PND, orthopnea, lower extremity edema, near syncope, syncope, strokelike symptoms. He denies any blurry vision, headaches or recent stroke like symptoms. \par \par He has since had follow up with pulmonology twice ( Dr Cedeno and Dr Omero Guo), he was said to have mild emphysema/moderate COPD, prescribed inhalers.  He has not noticed a significant change in dyspnea since starting inhalers.\par \par He has also followed up with neuro surgeon to to findings on CT chest from 10/26/22 that revealed degenerative sclerosis of C6-7.  He has been complaining of "pinching" feeling from the mid section of his left forearm, he was told may be related to his cervical spine. \par \par He also wanted to review results from the CT scan that stated coronary calcifications as well as ascending aortic calcifications.  He was wondering if there is an association with the pinching feeling he has in his forearm from time to time.\par He also states the measurement of his ascending aorta was stable at 4.5cm.\par \par He has been under a lot of pain due to a recurrent rectal abscess.  He had to have it drained earlier today, so he has been in and out of pain throughout the day.\par \par He is compliant with his medications. \par \par

## 2022-12-08 NOTE — END OF VISIT
[FreeTextEntry3] : I saw and evaluated the patient and discussed the care with the NP provider above on 11/30/2022 . I agree with the findings and plan as documented in the note above. given his persistent dyspnea and hx of abn nuc stress test in the past he will get a ct cors. R/B/A explained and he agrees.

## 2022-12-08 NOTE — CARDIOLOGY SUMMARY
[de-identified] : SR FOX [de-identified] : October 2019\par Exercise\par 11 METS\par \par  [de-identified] : May 2021\par EF 59%\par Minimal MR\par Grossly normal LV/RV\par Stage I diastolic dysfunction [de-identified] : CT chest 10/26\par Coronary artery calcifications\par Prominent ascending aorta measures 4.5 cm\par

## 2022-12-12 ENCOUNTER — APPOINTMENT (OUTPATIENT)
Dept: CARDIOLOGY | Facility: CLINIC | Age: 77
End: 2022-12-12

## 2022-12-12 PROCEDURE — 93015 CV STRESS TEST SUPVJ I&R: CPT

## 2022-12-12 PROCEDURE — 93306 TTE W/DOPPLER COMPLETE: CPT

## 2023-04-18 ENCOUNTER — APPOINTMENT (OUTPATIENT)
Dept: THORACIC SURGERY | Facility: CLINIC | Age: 78
End: 2023-04-18
Payer: MEDICARE

## 2023-04-18 VITALS
DIASTOLIC BLOOD PRESSURE: 81 MMHG | WEIGHT: 187 LBS | HEIGHT: 70 IN | RESPIRATION RATE: 16 BRPM | HEART RATE: 61 BPM | BODY MASS INDEX: 26.77 KG/M2 | OXYGEN SATURATION: 98 % | SYSTOLIC BLOOD PRESSURE: 143 MMHG

## 2023-04-18 PROCEDURE — 99214 OFFICE O/P EST MOD 30 MIN: CPT

## 2023-04-18 RX ORDER — FLUTICASONE FUROATE, UMECLIDINIUM BROMIDE AND VILANTEROL TRIFENATATE 100; 62.5; 25 UG/1; UG/1; UG/1
100-62.5-25 POWDER RESPIRATORY (INHALATION) DAILY
Qty: 1 | Refills: 5 | Status: COMPLETED | COMMUNITY
Start: 2022-07-01 | End: 2023-04-18

## 2023-04-18 RX ORDER — OMEPRAZOLE 40 MG/1
40 CAPSULE, DELAYED RELEASE ORAL
Qty: 90 | Refills: 3 | Status: COMPLETED | COMMUNITY
Start: 2022-06-13 | End: 2023-04-18

## 2023-04-18 RX ORDER — IBUPROFEN 200 MG
600 CAPSULE ORAL TWICE DAILY
Refills: 0 | Status: COMPLETED | COMMUNITY
Start: 2019-03-28 | End: 2023-04-18

## 2023-04-18 NOTE — HISTORY OF PRESENT ILLNESS
[FreeTextEntry1] : 77 year old male,with a history hypertension, hyperlipidemia, COPD, mildly dilated ascending aorta (4.4 cm). Incidentally found to have 3.7cm irregular lesion in the lung s/p FB, Right VATS, RUL Lobectomy on 4/19/18, pathology revealed T1c N0 adenocarcinoma. \par \par CT chest on 12/1/20\par - s/p RULobectomy; Unchanged linear scarring seen in the RML and RLL\par - Previously described focal ggo seen in the LLL has resolved\par - Unchanged, 3 mm perifissural nodule again seen in the RENAE (series 4, image 91) \par - Unchanged 2 mm pulmonary nodule seen in RENAE (series 4, image 136)\par - Ascending thoracic aorta measuring 4.4 cm; Unchanged \par - Simple appearing cyst upper pole right kidney measures 4.7 x 4.3 cm\par \par CXR on 3/18/21: No evidence of acute cardiothoracic process; Prior RLL segmental resection with tenting of the hemidiaphragm secondary to volume loss. \par \par CT chest on 11/01/2021:\par - Again noted are emphysema, and changes S/P right upper lobectomy, without evidence of nodule or infiltrate.\par \par F/u with Dr. Cedeno for COPD/asthma. \par \par CT chest on 10/26/2022: \par - No suspicious pulmonary nodule. No acute cardiothoracic process.\par - Prior right upper lobectomy with emphysema and scarring, unchanged.\par - Dense coronary calcifications with a prominent ascending aorta that is unchanged and measures 4.5 cm.\par - Worsened sclerosis at C6-7 presumed to be degenerative in nature and are better characterized on comparison MR cervical spine.\par \par CXR on 4/13/23: (Jd)\par - No acute cardiopulmonary pathology. No interval change compared to chest x-ray June 19, 2019.\par - Arteriosclerosis (aortic) \par - Stable postsurgical changes in the right lung.\par \par Patient presents today for follow up, c/o fatigue and SOB on exertion 2/2 COPD/emphysema. 2 minutes walk test (SpO2 dropped from 97% to 94%).

## 2023-04-18 NOTE — ASSESSMENT
[FreeTextEntry1] : 77 year old male,with a history hypertension, hyperlipidemia, COPD, mildly dilated ascending aorta (4.4cm). Incidentally found to have 3.7cm irregular lesion in the lung s/p FB, Right VATS, RUL Lobectomy on 4/19/18, pathology revealed T1c N0 adenocarcinoma. \par \par I have reviewed the patient's medical records and diagnostic images at time of this office consultation and have made the following recommendation:\par 1. CXR showed no evidence of recurrence, I recommended patient to return to office in 1yr w/ CT Chest w/o contrast.\par \par \par I, Dr. ROSE [facility], personally performed the evaluation and management (E/M) services for this established patient who presents today with (a) new problem(s)/exacerbation of (an) existing condition(s). That E/M includes conducting the examination, assessing all new/exacerbated conditions, and establishing a new plan of care. Today, my ACP, Leticia Cabna NP was here to observe my evaluation and management services for this new problem/exacerbated condition to be followed going forward.\par \par

## 2023-04-18 NOTE — CONSULT LETTER
[Dear  ___] : Dear  [unfilled], [Courtesy Letter:] : I had the pleasure of seeing your patient, [unfilled], in my office today. [Please see my note below.] : Please see my note below. [Sincerely,] : Sincerely, [FreeTextEntry2] : Dr. Jackson De La Cruz (PCP)\par Dr. Jono Perez (Pulm)\par Dr. Aidan Johnson (Spine Surgeon)\par Dr. Beatris Machado (Heme/Onc) [FreeTextEntry3] : Seven Rios MD, FACS \par Chief, Division of Thoracic Surgery \par Director, Minimally Invasive Thoracic Surgery \par Department of Cardiovascular and Thoracic Surgery \par [facility] \par , Cardiovascular and Thoracic Surgery\par \par \par

## 2023-06-08 NOTE — ASSESSMENT
[FreeTextEntry1] : Asthma COPD overlap some interval improvement with Trelegy, monitor spirometry on follow-up. all other ROS negative except as per HPI

## 2023-07-18 ENCOUNTER — APPOINTMENT (OUTPATIENT)
Dept: ORTHOPEDIC SURGERY | Facility: CLINIC | Age: 78
End: 2023-07-18
Payer: MEDICARE

## 2023-07-18 DIAGNOSIS — M17.11 UNILATERAL PRIMARY OSTEOARTHRITIS, RIGHT KNEE: ICD-10-CM

## 2023-07-18 DIAGNOSIS — M79.18 MYALGIA, OTHER SITE: ICD-10-CM

## 2023-07-18 DIAGNOSIS — M17.12 UNILATERAL PRIMARY OSTEOARTHRITIS, LEFT KNEE: ICD-10-CM

## 2023-07-18 PROCEDURE — 99214 OFFICE O/P EST MOD 30 MIN: CPT

## 2023-07-18 PROCEDURE — 73564 X-RAY EXAM KNEE 4 OR MORE: CPT | Mod: 50

## 2023-07-18 RX ORDER — NAPROXEN 500 MG/1
500 TABLET ORAL TWICE DAILY
Qty: 60 | Refills: 0 | Status: ACTIVE | COMMUNITY
Start: 2023-07-18 | End: 1900-01-01

## 2023-07-18 NOTE — ASSESSMENT
[FreeTextEntry1] : Right /  Bilateral /X-Ray Examination of the KNEE (4 views): medial and patellofemoral degenerate changes.\par \par - We discussed their diagnosis and treatment options at length including the risks and benefits of both surgical treatment with a knee replacement and non-surgical options.\par - We will continue conservative treatment with activity modification, PT, icing, weight loss, and anti-inflammatory medications.\par - The patient was provided with a PT prescription to work on ROM, hip ER/abductors strengthening, quad/hamstring stretches and strengthening, and other exercises.\par - The patient was advised to let pain guide the gradual advancement of activities.\par -  Naprosyn rx\par - Patient was given a prescription for an anti-inflammatory medication.  They will take it for the next week and then on an as needed basis, as long as there are no medical contra-indications.  Patient is counseled on possible GI, renal, and cardiovascular side effects.\par - We discussed the possible of injections in the future.\par - Follow up as needed in 6 weeks as to re-evaluate\par \par \par \par

## 2023-07-18 NOTE — IMAGING

## 2023-07-18 NOTE — HISTORY OF PRESENT ILLNESS
[de-identified] : 78 year old male  (RHD, Retired) chronic bilateral knee pain worsening since 5/15/23 with no LEROY \par The pain is located anterior, posterior and deep\par The pain is associated with popping, stiffness, heavy, burning \par Worse with prolonged sitting and better with light activity\par Has tried acitivyt mod, PT in past\par H/O hypertension, COPD, lumbar laminectomy, upper right lobe lung cancer stage I\par

## 2023-08-09 ENCOUNTER — APPOINTMENT (OUTPATIENT)
Dept: ORTHOPEDIC SURGERY | Facility: CLINIC | Age: 78
End: 2023-08-09
Payer: MEDICARE

## 2023-08-09 VITALS — BODY MASS INDEX: 26.77 KG/M2 | HEIGHT: 70 IN | WEIGHT: 187 LBS

## 2023-08-09 DIAGNOSIS — M62.838 OTHER MUSCLE SPASM: ICD-10-CM

## 2023-08-09 DIAGNOSIS — M54.50 LOW BACK PAIN, UNSPECIFIED: ICD-10-CM

## 2023-08-09 DIAGNOSIS — M54.12 RADICULOPATHY, CERVICAL REGION: ICD-10-CM

## 2023-08-09 DIAGNOSIS — M54.2 CERVICALGIA: ICD-10-CM

## 2023-08-09 PROCEDURE — 72100 X-RAY EXAM L-S SPINE 2/3 VWS: CPT

## 2023-08-09 PROCEDURE — 72040 X-RAY EXAM NECK SPINE 2-3 VW: CPT

## 2023-08-09 PROCEDURE — 99214 OFFICE O/P EST MOD 30 MIN: CPT

## 2023-08-09 NOTE — IMAGING
[Facet arthropathy] : Facet arthropathy [Disc space narrowing] : Disc space narrowing [Spondylolithesis] : Spondylolithesis [FreeTextEntry1] : Grade 1 l4-5 spondylolisthesis

## 2023-08-09 NOTE — HISTORY OF PRESENT ILLNESS
[Neck] : neck [Lower back] : lower back [7] : 7 [5] : 5 [Dull/Aching] : dull/aching [Sharp] : sharp [Tingling] : tingling [de-identified] : 78 year old RHD male presents today with complaints of neck and low back symptoms Patient with Hx of lumbar laminectomy L4/5 in 2017 for low back pain and Right radicular pain. Patient reports relief of symptoms postop. Patient now experiencing low back stiffness and left lower extremity dull ache while walking, intermittent n/t with sitting. Patient did have flare of RLE symptoms resolved with naproxen. Patient stretches for short term relief of symptoms.  Patient recently seen by Dr. Robins for B knee OA, recommending conservative treatment. MRI of lumbar spine completed in 9/25//2022 with following impression L4-L5 laminectomy. L4-L5 anterolisthesis, unchanged. Spondylosis and facet arthrosis as noted. A central and left-sided disc protrusion at the T12-L1 level is resolved since the 9/8/2018 MR study. Mild central canal stenosis at L2-L3, unchanged. Mild central canal stenosis at L3-L4, unchanged. Moderate L4-L5 spondylosis, worse compared to the previous MR study. Moderate-to-severe central canal stenosis at that level is slightly worse. An MR appearance suggesting mild chronic arachnoiditis at that level is unchanged.  Patient also reports neck stiffness, crepitus with intermittent left lateral arm ache. Symptoms have improved with conservative measures. Cervical xrays completed today showing C5/6 disc narrowing.   PmHx: HTN, Pre-DM (A1c 6.1) Hx of lesion in the lung s/p FB, Right VATS, RUL Lobectomy on 4/19/18, pathology revealed T1c N0 adenocarcinoma. Enlarged Aorta  Occupation: Retired [] : Post Surgical Visit: no [FreeTextEntry5] : pt presents today for a new injury visit for his L-spine/ C-spine. states worsening neck and lower back pain. reports n/t down the legs.  [FreeTextEntry6] : numbness

## 2023-08-09 NOTE — ASSESSMENT
[FreeTextEntry1] : 78M with LBP and neurogenic claudication Pain improved with naproxen rxed by Julienne hx of l4-5 laminectomy recurrent stenosis at that level as well as worsening of spondylolisthesis from initial report with Dr. Johnson Recommedn PT for low back nsaids prn FU 2 months if pain recurs consider WINTER

## 2023-09-01 ENCOUNTER — APPOINTMENT (OUTPATIENT)
Dept: PULMONOLOGY | Facility: CLINIC | Age: 78
End: 2023-09-01
Payer: MEDICARE

## 2023-09-01 VITALS
RESPIRATION RATE: 18 BRPM | HEIGHT: 70 IN | DIASTOLIC BLOOD PRESSURE: 95 MMHG | WEIGHT: 187 LBS | HEART RATE: 68 BPM | BODY MASS INDEX: 26.77 KG/M2 | SYSTOLIC BLOOD PRESSURE: 156 MMHG | OXYGEN SATURATION: 95 %

## 2023-09-01 PROCEDURE — 94010 BREATHING CAPACITY TEST: CPT

## 2023-09-01 PROCEDURE — 99214 OFFICE O/P EST MOD 30 MIN: CPT | Mod: 25

## 2023-09-01 NOTE — HISTORY OF PRESENT ILLNESS
[TextBox_4] : asthma copd overlap  has stopped inhalers for a few months  has no coughing  gets winded with exertion--chronic

## 2023-10-02 ENCOUNTER — APPOINTMENT (OUTPATIENT)
Dept: PULMONOLOGY | Facility: CLINIC | Age: 78
End: 2023-10-02
Payer: MEDICARE

## 2023-10-02 ENCOUNTER — NON-APPOINTMENT (OUTPATIENT)
Age: 78
End: 2023-10-02

## 2023-10-02 VITALS
DIASTOLIC BLOOD PRESSURE: 82 MMHG | BODY MASS INDEX: 27.49 KG/M2 | WEIGHT: 192 LBS | HEART RATE: 67 BPM | SYSTOLIC BLOOD PRESSURE: 130 MMHG | HEIGHT: 70 IN | OXYGEN SATURATION: 96 %

## 2023-10-02 PROCEDURE — 94010 BREATHING CAPACITY TEST: CPT

## 2023-10-02 PROCEDURE — 99213 OFFICE O/P EST LOW 20 MIN: CPT | Mod: 25

## 2023-10-02 RX ORDER — UMECLIDINIUM BROMIDE AND VILANTEROL TRIFENATATE 62.5; 25 UG/1; UG/1
62.5-25 POWDER RESPIRATORY (INHALATION)
Qty: 60 | Refills: 2 | Status: ACTIVE | COMMUNITY
Start: 2023-10-02 | End: 1900-01-01

## 2023-11-03 ENCOUNTER — APPOINTMENT (OUTPATIENT)
Dept: PULMONOLOGY | Facility: CLINIC | Age: 78
End: 2023-11-03
Payer: MEDICARE

## 2023-11-03 VITALS
HEART RATE: 63 BPM | BODY MASS INDEX: 27.49 KG/M2 | HEIGHT: 70 IN | OXYGEN SATURATION: 96 % | DIASTOLIC BLOOD PRESSURE: 88 MMHG | SYSTOLIC BLOOD PRESSURE: 148 MMHG | WEIGHT: 192 LBS

## 2023-11-03 PROCEDURE — ZZZZZ: CPT

## 2023-11-03 PROCEDURE — 94010 BREATHING CAPACITY TEST: CPT

## 2023-11-03 PROCEDURE — 99213 OFFICE O/P EST LOW 20 MIN: CPT | Mod: 25

## 2023-11-03 PROCEDURE — 94726 PLETHYSMOGRAPHY LUNG VOLUMES: CPT

## 2023-11-03 PROCEDURE — 94729 DIFFUSING CAPACITY: CPT

## 2024-02-01 ENCOUNTER — EMERGENCY (EMERGENCY)
Facility: HOSPITAL | Age: 79
LOS: 1 days | Discharge: HOME | End: 2024-02-01
Attending: EMERGENCY MEDICINE
Payer: COMMERCIAL

## 2024-02-01 VITALS
HEART RATE: 71 BPM | OXYGEN SATURATION: 97 % | TEMPERATURE: 98 F | RESPIRATION RATE: 18 BRPM | SYSTOLIC BLOOD PRESSURE: 183 MMHG | DIASTOLIC BLOOD PRESSURE: 99 MMHG

## 2024-02-01 VITALS
OXYGEN SATURATION: 96 % | DIASTOLIC BLOOD PRESSURE: 103 MMHG | RESPIRATION RATE: 20 BRPM | WEIGHT: 190.04 LBS | SYSTOLIC BLOOD PRESSURE: 177 MMHG | TEMPERATURE: 98 F | HEIGHT: 70 IN | HEART RATE: 75 BPM

## 2024-02-01 DIAGNOSIS — K86.2 CYST OF PANCREAS: Chronic | ICD-10-CM

## 2024-02-01 DIAGNOSIS — Z98.890 OTHER SPECIFIED POSTPROCEDURAL STATES: Chronic | ICD-10-CM

## 2024-02-01 PROCEDURE — 99283 EMERGENCY DEPT VISIT LOW MDM: CPT

## 2024-02-01 PROCEDURE — 99282 EMERGENCY DEPT VISIT SF MDM: CPT

## 2024-02-01 RX ORDER — ACETAMINOPHEN 500 MG
975 TABLET ORAL ONCE
Refills: 0 | Status: COMPLETED | OUTPATIENT
Start: 2024-02-01 | End: 2024-02-01

## 2024-02-01 RX ADMIN — Medication 975 MILLIGRAM(S): at 21:07
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
## 2024-02-01 NOTE — ED ADULT NURSE NOTE - NSFALLUNIVINTERV_ED_ALL_ED
Bed/Stretcher in lowest position, wheels locked, appropriate side rails in place/Call bell, personal items and telephone in reach/Instruct patient to call for assistance before getting out of bed/chair/stretcher/Non-slip footwear applied when patient is off stretcher/[place] to call system/Physically safe environment - no spills, clutter or unnecessary equipment/Purposeful proactive rounding/Room/bathroom lighting operational, light cord in reach

## 2024-02-01 NOTE — ED ADULT NURSE NOTE - COLLISION WITH
Visited pt at bedside. Pt's dtr, Carmen, present. Introduced role and provided her with resources from [employer] requested from Dr. Ngo for estate planning/will creation. Carmen states that her family is currently working with two attorneys regarding this, but states it's helpful to have further resources. Carmen states that she is visiting from [place], but her younger sister and brother ([place]) are local. Carmen states that her younger sister, is having a hard time as she is only 15. Offered to see if there are any resources that can be provided to assist with supporting her/family during this difficult time. Support offered.    Monika Erickson, OSCARN, RN-BC    [id number]     car

## 2024-02-01 NOTE — ED PROVIDER NOTE - NSFOLLOWUPINSTRUCTIONS_ED_ALL_ED_FT
Return for worsening headaches or change in mental status take Tylenol for pain rest ice no heavy lifting.  Follow-up with your doctor tomorrow CT head imaging

## 2024-02-01 NOTE — ED PROVIDER NOTE - PATIENT PORTAL LINK FT
You can access the FollowMyHealth Patient Portal offered by [facility] by registering at the following website: [email or web address]. By joining [employer]’s FollowMyHealth portal, you will also be able to view your health information using other applications (apps) compatible with our system.
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
## 2024-02-08 ENCOUNTER — APPOINTMENT (OUTPATIENT)
Dept: PULMONOLOGY | Facility: CLINIC | Age: 79
End: 2024-02-08

## 2024-02-16 ENCOUNTER — APPOINTMENT (OUTPATIENT)
Dept: PULMONOLOGY | Facility: CLINIC | Age: 79
End: 2024-02-16
Payer: MEDICARE

## 2024-02-16 ENCOUNTER — NON-APPOINTMENT (OUTPATIENT)
Age: 79
End: 2024-02-16

## 2024-02-16 VITALS
DIASTOLIC BLOOD PRESSURE: 78 MMHG | HEIGHT: 70 IN | HEART RATE: 96 BPM | BODY MASS INDEX: 27.2 KG/M2 | SYSTOLIC BLOOD PRESSURE: 142 MMHG | OXYGEN SATURATION: 96 % | WEIGHT: 190 LBS

## 2024-02-16 PROCEDURE — 94010 BREATHING CAPACITY TEST: CPT

## 2024-02-16 PROCEDURE — 99214 OFFICE O/P EST MOD 30 MIN: CPT | Mod: 25

## 2024-02-16 NOTE — ASSESSMENT
[FreeTextEntry1] : it seems his lung function is better when he is on anoro we have proven this repeatedly with spirometry on and off meds.  He also feels a little better when walking on the anoro.  I suggest he restart it daily.  He would rather just increase his exercise regiment than be on medication, I think this is ok too as he is mostly asymptomatic.  he will fu in 3 mo for another spirometry.  Total encounter time 30 minutes.

## 2024-02-16 NOTE — HISTORY OF PRESENT ILLNESS
[TextBox_4] : has been off anoro for 2 mo feels fine with ADLs, when he walks for 30 min or more feels more winded/chest tight otherwise doing ok

## 2024-02-16 NOTE — PROCEDURE
[FreeTextEntry1] : Fam: FEV1 decreased again  Prior exams reviewed:  [place]: FEV 1 improved from the month prior.  fam: still with moderate obstruction, improved from prior.  fam: severe obstruction, worse than prior

## 2024-03-13 ENCOUNTER — APPOINTMENT (OUTPATIENT)
Dept: CARDIOLOGY | Facility: CLINIC | Age: 79
End: 2024-03-13
Payer: MEDICARE

## 2024-03-13 VITALS
WEIGHT: 193 LBS | HEART RATE: 70 BPM | SYSTOLIC BLOOD PRESSURE: 151 MMHG | DIASTOLIC BLOOD PRESSURE: 82 MMHG | OXYGEN SATURATION: 96 % | BODY MASS INDEX: 27.63 KG/M2 | HEIGHT: 70 IN

## 2024-03-13 DIAGNOSIS — I25.10 ATHEROSCLEROTIC HEART DISEASE OF NATIVE CORONARY ARTERY W/OUT ANGINA PECTORIS: ICD-10-CM

## 2024-03-13 DIAGNOSIS — I77.89 OTHER SPECIFIED DISORDERS OF ARTERIES AND ARTERIOLES: ICD-10-CM

## 2024-03-13 DIAGNOSIS — I25.84 ATHEROSCLEROTIC HEART DISEASE OF NATIVE CORONARY ARTERY W/OUT ANGINA PECTORIS: ICD-10-CM

## 2024-03-13 PROCEDURE — 93000 ELECTROCARDIOGRAM COMPLETE: CPT

## 2024-03-13 PROCEDURE — 93306 TTE W/DOPPLER COMPLETE: CPT

## 2024-03-13 PROCEDURE — G2211 COMPLEX E/M VISIT ADD ON: CPT

## 2024-03-13 PROCEDURE — 99214 OFFICE O/P EST MOD 30 MIN: CPT

## 2024-03-13 RX ORDER — ATENOLOL 100 MG/1
100 TABLET ORAL DAILY
Qty: 90 | Refills: 2 | Status: ACTIVE | COMMUNITY

## 2024-03-13 RX ORDER — OLMESARTAN MEDOXOMIL 40 MG/1
40 TABLET, FILM COATED ORAL
Qty: 90 | Refills: 2 | Status: ACTIVE | COMMUNITY

## 2024-03-13 NOTE — HISTORY OF PRESENT ILLNESS
[FreeTextEntry1] : 78  year old man with a history hypertension, hyperlipidemia, mildly dilated ascending aorta (4.4cm). He incidentally was found to have 3.7cm irregular lesion in the lung  s/p FB, Right VATS, RUL Lobectomy on 4/19/18, pathology revealed adenocarcinoma,  COPD.  He  saw a cardiologist at [facility] in 2019.. He notes that he had a stress test in 7/2019 at [facility] that showed defect apical inferior and mid inferior wall that was reversible with normal wall motion. The echo done at [facility] in 7/2019 showed normal LV function with a mild ascending aorta 4.15.   He had a plain stress test on 10/16/19 with excellent exercise tolerance without any anginal symptoms and without EKG changes.  Since his last visit  he is still complaining of throat tightness and dyspnea on exertion when walking in cold weather. He walks about 30 minutes a day. He   denies any chest pain, PND, orthopnea, lower extremity edema, near syncope, syncope, strokelike symptoms.  Medication reconciliation performed. He is compliant with his medications.

## 2024-03-13 NOTE — DISCUSSION/SUMMARY
[FreeTextEntry1] : 77  year old man with a history of hypertension presents for a followup cardiac evaluation.  Salazar is still complaining of dyspnea and throat tightness only in cold weather. His EKG is unchanged from previous. This may be related to a pulmonary or upper airway issue. he should see ENT. He does not want to do any cardiac testing that may involve contrast. I will defer testing for now.   In light of coronary calcifications, I have advised that he start statin therapy but he refused. His most recent LDL was 92.  Goal LDL is less than 100 ideally less than 70. His blood pressure is is not optimal today however he does not want to add more medications.   He will continue his Atenolol 100mg Qday and Olmesartan 40mg Qday.   He has an enlarged aorta. He needs a repeat echo.  Exercise and diet counseling was performed in order to reduce her future cardiovascular risk.  If all is well, he will followup with me in 6 months or sooner if necessary.   [EKG obtained to assist in diagnosis and management of assessed problem(s)] : EKG obtained to assist in diagnosis and management of assessed problem(s)

## 2024-03-13 NOTE — PHYSICAL EXAM
[Well Developed] : well developed [Normal S1, S2] : normal S1, S2 [No Abnormalities] : the abdominal aorta was not enlarged and no bruit was heard [Clear Lung Fields] : clear lung fields [Good Air Entry] : good air entry [Normal Gait] : normal gait [Soft] : abdomen soft [No Edema] : no edema [No Rash] : no rash [Moves all extremities] : moves all extremities [General Appearance - Well Developed] : well developed [Normal Appearance] : normal appearance [Well Groomed] : well groomed [General Appearance - In No Acute Distress] : no acute distress [General Appearance - Well Nourished] : well nourished [No Deformities] : no deformities [Normal Conjunctiva] : the conjunctiva exhibited no abnormalities [Normal Oral Mucosa] : normal oral mucosa [Eyelids - No Xanthelasma] : the eyelids demonstrated no xanthelasmas [No Oral Pallor] : no oral pallor [Normal Jugular Venous A Waves Present] : normal jugular venous A waves present [No Oral Cyanosis] : no oral cyanosis [No Jugular Venous Sánchez A Waves] : no jugular venous sánchez A waves [Normal Jugular Venous V Waves Present] : normal jugular venous V waves present [Respiration, Rhythm And Depth] : normal respiratory rhythm and effort [Auscultation Breath Sounds / Voice Sounds] : lungs were clear to auscultation bilaterally [Exaggerated Use Of Accessory Muscles For Inspiration] : no accessory muscle use [Abdomen Tenderness] : non-tender [Abdomen Soft] : soft [Abdomen Mass (___ Cm)] : no abdominal mass palpated [Gait - Sufficient For Exercise Testing] : the gait was sufficient for exercise testing [Abnormal Walk] : normal gait [Nail Clubbing] : no clubbing of the fingernails [Cyanosis, Localized] : no localized cyanosis [Petechial Hemorrhages (___cm)] : no petechial hemorrhages [Skin Color & Pigmentation] : normal skin color and pigmentation [] : no rash [No Venous Stasis] : no venous stasis [Skin Lesions] : no skin lesions [No Skin Ulcers] : no skin ulcer [No Xanthoma] : no  xanthoma was observed [Oriented To Time, Place, And Person] : oriented to person, place, and time [FreeTextEntry1] : anxious [Normal Rate] : normal [Rhythm Regular] : regular [Normal S1] : normal S1 [Normal S2] : normal S2 [No Gallop] : no gallop heard [No Murmur] : no murmurs heard [Right Carotid Bruit] : no bruit heard over the right carotid [Left Carotid Bruit] : no bruit heard over the left carotid [2+] : left 2+ [Bruit] : no bruit heard [No Pitting Edema] : no pitting edema present

## 2024-03-13 NOTE — CARDIOLOGY SUMMARY
[de-identified] :  2019 ([facility]) Exercise 11 METS small apical inferior wall defect possibl eischemia.  12/2022  TMET No ischemic ST segment changes.   [de-identified] : Sinus Rhythm  Low voltage in precordial leads. -Left axis -anterior fascicular block. [de-identified] : May 2021 EF 59% Minimal MR Grossly normal LV/RV Stage I diastolic dysfunction [de-identified] : CT chest 10/26 Coronary artery calcifications Prominent ascending aorta measures 4.5 cm

## 2024-04-05 RX ORDER — FLUTICASONE FUROATE, UMECLIDINIUM BROMIDE AND VILANTEROL TRIFENATATE 100; 62.5; 25 UG/1; UG/1; UG/1
100-62.5-25 POWDER RESPIRATORY (INHALATION)
Qty: 1 | Refills: 5 | Status: ACTIVE | COMMUNITY
Start: 2023-09-01 | End: 1900-01-01

## 2024-04-16 ENCOUNTER — APPOINTMENT (OUTPATIENT)
Dept: THORACIC SURGERY | Facility: CLINIC | Age: 79
End: 2024-04-16

## 2024-05-14 ENCOUNTER — APPOINTMENT (OUTPATIENT)
Dept: CV DIAGNOSITCS | Facility: HOSPITAL | Age: 79
End: 2024-05-14

## 2024-05-14 ENCOUNTER — OUTPATIENT (OUTPATIENT)
Dept: OUTPATIENT SERVICES | Facility: HOSPITAL | Age: 79
LOS: 1 days | End: 2024-05-14
Payer: MEDICARE

## 2024-05-14 ENCOUNTER — RESULT REVIEW (OUTPATIENT)
Age: 79
End: 2024-05-14

## 2024-05-14 DIAGNOSIS — R06.02 SHORTNESS OF BREATH: ICD-10-CM

## 2024-05-14 DIAGNOSIS — K86.2 CYST OF PANCREAS: Chronic | ICD-10-CM

## 2024-05-14 DIAGNOSIS — Z98.890 OTHER SPECIFIED POSTPROCEDURAL STATES: Chronic | ICD-10-CM

## 2024-05-14 PROCEDURE — 93351 STRESS TTE COMPLETE: CPT

## 2024-05-14 PROCEDURE — 93017 CV STRESS TEST TRACING ONLY: CPT

## 2024-05-14 PROCEDURE — C8930: CPT

## 2024-05-14 PROCEDURE — 93351 STRESS TTE COMPLETE: CPT | Mod: 26

## 2024-05-20 ENCOUNTER — APPOINTMENT (OUTPATIENT)
Dept: PULMONOLOGY | Facility: CLINIC | Age: 79
End: 2024-05-20
Payer: MEDICARE

## 2024-05-20 ENCOUNTER — NON-APPOINTMENT (OUTPATIENT)
Age: 79
End: 2024-05-20

## 2024-05-20 VITALS — BODY MASS INDEX: 26.77 KG/M2 | OXYGEN SATURATION: 95 % | WEIGHT: 187 LBS | HEART RATE: 69 BPM | HEIGHT: 70 IN

## 2024-05-20 VITALS — SYSTOLIC BLOOD PRESSURE: 140 MMHG | DIASTOLIC BLOOD PRESSURE: 81 MMHG

## 2024-05-20 DIAGNOSIS — C34.91 MALIGNANT NEOPLASM OF UNSPECIFIED PART OF RIGHT BRONCHUS OR LUNG: ICD-10-CM

## 2024-05-20 DIAGNOSIS — J44.89 OTHER SPECIFIED CHRONIC OBSTRUCTIVE PULMONARY DISEASE: ICD-10-CM

## 2024-05-20 PROCEDURE — 99214 OFFICE O/P EST MOD 30 MIN: CPT | Mod: 25

## 2024-05-20 PROCEDURE — 94010 BREATHING CAPACITY TEST: CPT

## 2024-05-20 NOTE — PROCEDURE
[FreeTextEntry1] : Fam: moderate obstruction, improved from prior.   Prior exams reviewed: [place]: FEV1 decreased again   Fam: FEV 1 improved from the month prior.  fam: still with moderate obstruction, improved from prior.  fam: severe obstruction, worse than prior

## 2024-05-20 NOTE — ASSESSMENT
[FreeTextEntry1] : he would prefer to stay off inhalers as he is feeling well most of the time only gets sob with significant exertion  he has refills of trelegy should he want to restart  I asked him to fu with , per notes.  fu 6 mo

## 2024-05-20 NOTE — HISTORY OF PRESENT ILLNESS
[TextBox_4] : he is doing well we had prescribed trelegy bc of insurance coverage issues a few weeks back he took it for 1 mo then stopped he is feeling ok he is swimming, rests between laps and is ok  according to chart review  would like to see him as of april 2024 with ct chest to monitor lung ca (prior resection)

## 2024-05-30 ENCOUNTER — APPOINTMENT (OUTPATIENT)
Dept: PODIATRY | Facility: CLINIC | Age: 79
End: 2024-05-30
Payer: MEDICARE

## 2024-05-30 DIAGNOSIS — J44.9 CHRONIC OBSTRUCTIVE PULMONARY DISEASE, UNSPECIFIED: ICD-10-CM

## 2024-05-30 DIAGNOSIS — E11.9 TYPE 2 DIABETES MELLITUS W/OUT COMPLICATIONS: ICD-10-CM

## 2024-05-30 DIAGNOSIS — K90.0 CELIAC DISEASE: ICD-10-CM

## 2024-05-30 DIAGNOSIS — J45.909 UNSPECIFIED ASTHMA, UNCOMPLICATED: ICD-10-CM

## 2024-05-30 DIAGNOSIS — M79.673 PAIN IN UNSPECIFIED FOOT: ICD-10-CM

## 2024-05-30 PROCEDURE — 99203 OFFICE O/P NEW LOW 30 MIN: CPT

## 2024-05-30 PROCEDURE — 73620 X-RAY EXAM OF FOOT: CPT | Mod: 50

## 2024-05-30 RX ORDER — PSYLLIUM HUSK 0.4 G
CAPSULE ORAL
Refills: 0 | Status: ACTIVE | COMMUNITY

## 2024-05-30 RX ORDER — DICLOFENAC SODIUM 100 MG/1
100 TABLET, FILM COATED, EXTENDED RELEASE ORAL
Qty: 30 | Refills: 0 | Status: ACTIVE | COMMUNITY
Start: 2024-05-30 | End: 1900-01-01

## 2024-05-30 NOTE — PROCEDURE
[FreeTextEntry1] : Plan:  Examination.  (Mc=[id number]).  We had a lengthy discussion concerning the patient's condition.  We reviewed his x-rays.  (Wm=01305).  Rx: Voltaren 100 xr qd pc. We recommended the patient try sneakers size 10 4e.  He is to continue all activity to tolerence. Patient to return:  1 week.

## 2024-05-30 NOTE — PHYSICAL EXAM
[FreeTextEntry3] : Vascular Exam: DP Pulse (left): 1/4. DP Pulse (right): 1/4. PT Pulse (left): 1/4. PT Pulse (right): 1/4. Capillary Return - L: Instantaneous. Capillary Return - R: Instantaneous. Temperature Grad - L: Hot to Warm. Temperature Grad - R: Hot to Warm. [de-identified] : Orthopedic Exam:  Patient presents with a hypermobile varus foot type with bunion and tailer bunion deformity left greater than right.  The 5th toes also partially underlap the 4th.  The middle 3 toes tend to stick together. XRAY:  2 views of both feet demonstrate bunion and jesus bunion deformity.  Posterior heel spurs bilaterally. [FreeTextEntry1] : Neurological Exam: Sharp / Dull - L: WNL. Sharp / Dull - R: WNL. Light Touch/pressure - L: WNL. Light Touch/pressure - R: WNL. Hot/cold - L: WNL. Hot/cold - R: WNL. Vibratory - L: WNL. Vibratory - R: WNL.

## 2024-05-30 NOTE — HISTORY OF PRESENT ILLNESS
[FreeTextEntry1] : Salazar is a 79-year-old male who presents today for the first time complaining of pain and stiffness in his feet and toes left greater than right.  He also has bumps he thinks he did not have before.  He bought new sneakers that only helped a little.  He has done no other self-treatment.

## 2024-06-03 PROBLEM — J45.909 ASTHMA: Status: ACTIVE | Noted: 2024-05-30

## 2024-06-03 PROBLEM — E11.9 DIABETES: Status: ACTIVE | Noted: 2024-05-30

## 2024-06-03 PROBLEM — J44.9 COPD (CHRONIC OBSTRUCTIVE PULMONARY DISEASE): Status: ACTIVE | Noted: 2024-05-30

## 2024-06-14 ENCOUNTER — APPOINTMENT (OUTPATIENT)
Dept: PODIATRY | Facility: CLINIC | Age: 79
End: 2024-06-14
Payer: MEDICARE

## 2024-06-14 DIAGNOSIS — M77.52 OTHER ENTHESOPATHY OF LT FOOT AND ANKLE: ICD-10-CM

## 2024-06-14 DIAGNOSIS — L60.3 NAIL DYSTROPHY: ICD-10-CM

## 2024-06-14 DIAGNOSIS — B35.1 TINEA UNGUIUM: ICD-10-CM

## 2024-06-14 PROCEDURE — 11755 BIOPSY NAIL UNIT: CPT | Mod: TA

## 2024-06-14 PROCEDURE — 99212 OFFICE O/P EST SF 10 MIN: CPT | Mod: 25

## 2024-06-17 PROBLEM — M77.52 BURSITIS OF LEFT FOOT: Status: ACTIVE | Noted: 2024-05-30

## 2024-06-17 PROBLEM — L60.3 DYSTROPHIC NAIL: Status: ACTIVE | Noted: 2024-06-17

## 2024-06-17 NOTE — PHYSICAL EXAM
[FreeTextEntry3] : Vascular Exam: DP Pulse (left): 1/4. DP Pulse (right): 1/4. PT Pulse (left): 1/4. PT Pulse (right): 1/4. Capillary Return - L: Instantaneous. Capillary Return - R: Instantaneous. Temperature Grad - L: Hot to Warm. Temperature Grad - R: Hot to Warm. [de-identified] : Orthopedic Exam:  Patient presents with a hypermobile varus foot type with bunion and tailer bunion deformity left greater than right.  The 5th toes also partially underlap the 4th.  The middle 3 toes tend to stick together.  Patient expresses no discomfort upon examination at this time.  His new shoes are wider and better accommodate his condition. [FreeTextEntry1] : Neurological Exam: Sharp / Dull - L: WNL. Sharp / Dull - R: WNL. Light Touch/pressure - L: WNL. Light Touch/pressure - R: WNL. Hot/cold - L: WNL. Hot/cold - R: WNL. Vibratory - L: WNL. Vibratory - R: WNL.

## 2024-06-17 NOTE — PROCEDURE
[FreeTextEntry1] : Plan:  Examination.  (Cy=[id number]).  We had a lengthy discussion concerning the patient's condition.  D/c Voltaren.  Patient to continue in these sneakers.  We discussed the benefits of custom foot orthosis.  Nail biopsy was performed.  (Bv=[id number]).  Results to be reviewed when available. Patient to return:  1 month.

## 2024-06-17 NOTE — HISTORY OF PRESENT ILLNESS
[FreeTextEntry1] : Salazar is a 79-year-old male who presents this afternoon for continued care of pain and stiffness in his feet and toes left greater than right.  He did not take his medication.  He bought new sneakers that helped a lot.  He also complains of difficulty with his big toenails.

## 2024-06-20 PROBLEM — B35.1 TINEA UNGUIUM: Status: ACTIVE | Noted: 2024-06-20

## 2024-06-20 RX ORDER — CICLOPIROX 80 MG/ML
8 SOLUTION TOPICAL
Qty: 1 | Refills: 0 | Status: ACTIVE | COMMUNITY
Start: 2024-06-20 | End: 1900-01-01

## 2024-07-25 ENCOUNTER — APPOINTMENT (OUTPATIENT)
Dept: PODIATRY | Facility: CLINIC | Age: 79
End: 2024-07-25

## 2024-07-30 ENCOUNTER — APPOINTMENT (OUTPATIENT)
Dept: PODIATRY | Facility: CLINIC | Age: 79
End: 2024-07-30
Payer: MEDICARE

## 2024-07-30 DIAGNOSIS — B35.1 TINEA UNGUIUM: ICD-10-CM

## 2024-07-30 PROCEDURE — 99212 OFFICE O/P EST SF 10 MIN: CPT

## 2024-07-30 NOTE — PHYSICAL EXAM
[FreeTextEntry3] : Vascular Exam: DP Pulse (left): 1/4. DP Pulse (right): 1/4. PT Pulse (left): 1/4. PT Pulse (right): 1/4. Capillary Return - L: Instantaneous. Capillary Return - R: Instantaneous. Temperature Grad - L: Hot to Warm. Temperature Grad - R: Hot to Warm. [de-identified] : Orthopedic Exam:  Patient presents with a hypermobile varus foot type with bunion, tailer's bunion and hammertoe deformities left greater than right.  The 5th toes also partially underlap the 4th.  The middle 3 toes tend to stick together.  [FreeTextEntry1] : Neurological Exam: Sharp / Dull - L: WNL. Sharp / Dull - R: WNL. Light Touch/pressure - L: WNL. Light Touch/pressure - R: WNL. Hot/cold - L: WNL. Hot/cold - R: WNL. Vibratory - L: WNL. Vibratory - R: WNL.

## 2024-07-30 NOTE — PROCEDURE
[FreeTextEntry1] : Plan:  Examination.  (Gp=[id number]).   Debridement of the mycotic nails by manual means and electric  to patient's tolerance.  Patient to continue to apply Penlac to the affected toenails daily.  Patient to return:  1 month.

## 2024-07-30 NOTE — HISTORY OF PRESENT ILLNESS
[FreeTextEntry1] : Salazar is a 79-year-old male who presents to the office today for continued care of a fungus infection in his big toenails.  It was confirmed by nail biopsy.  He has started his Penlac and sees improvement.

## 2024-07-30 NOTE — REVIEW OF SYSTEMS
[Negative] : Gastrointestinal [FreeTextEntry9] : khushboo parker [de-identified] : fungal toenails

## 2024-09-16 ENCOUNTER — APPOINTMENT (OUTPATIENT)
Dept: PODIATRY | Facility: CLINIC | Age: 79
End: 2024-09-16
Payer: MEDICARE

## 2024-09-16 DIAGNOSIS — B35.1 TINEA UNGUIUM: ICD-10-CM

## 2024-09-16 PROCEDURE — 99212 OFFICE O/P EST SF 10 MIN: CPT

## 2024-09-16 NOTE — HISTORY OF PRESENT ILLNESS
[FreeTextEntry1] : Salazar is a 79-year-old male who presents to our office for continued care of a fungus infection in his big toenails.  He continues to use Penlac and sees improvement.

## 2024-09-16 NOTE — REVIEW OF SYSTEMS
[Negative] : Gastrointestinal [FreeTextEntry9] : khushboo parker [de-identified] : fungal toenails

## 2024-09-16 NOTE — PROCEDURE
[FreeTextEntry1] : Plan:  Examination.  (Hq=12175).   Debridement of the previously mycotic nails by manual means and electric  to patient's tolerance.  Patient to discontinue Penlac.  We discussed precautions to avoid recurrence.  Patient to return: as needed.

## 2024-09-16 NOTE — PHYSICAL EXAM
[FreeTextEntry3] : Vascular Exam: DP Pulse (left): 1/4. DP Pulse (right): 1/4. PT Pulse (left): 1/4. PT Pulse (right): 1/4. Capillary Return - L: Instantaneous. Capillary Return - R: Instantaneous. Temperature Grad - L: Hot to Warm. Temperature Grad - R: Hot to Warm. [de-identified] : Orthopedic Exam:  Patient presents with a hypermobile varus foot type with bunion, tailer's bunion and hammertoe deformities left greater than right.  The 5th toes also partially underlap the 4th.  The middle 3 toes tend to stick together.  [FreeTextEntry1] : Neurological Exam: Sharp / Dull - L: WNL. Sharp / Dull - R: WNL. Light Touch/pressure - L: WNL. Light Touch/pressure - R: WNL. Hot/cold - L: WNL. Hot/cold - R: WNL. Vibratory - L: WNL. Vibratory - R: WNL.

## 2024-09-18 ENCOUNTER — APPOINTMENT (OUTPATIENT)
Dept: PODIATRY | Facility: CLINIC | Age: 79
End: 2024-09-18
Payer: MEDICARE

## 2024-11-12 ENCOUNTER — APPOINTMENT (OUTPATIENT)
Dept: UROLOGY | Facility: CLINIC | Age: 79
End: 2024-11-12
Payer: MEDICARE

## 2024-11-12 ENCOUNTER — NON-APPOINTMENT (OUTPATIENT)
Age: 79
End: 2024-11-12

## 2024-11-12 VITALS
WEIGHT: 182 LBS | HEART RATE: 64 BPM | HEIGHT: 70 IN | DIASTOLIC BLOOD PRESSURE: 89 MMHG | TEMPERATURE: 97.9 F | SYSTOLIC BLOOD PRESSURE: 153 MMHG | BODY MASS INDEX: 26.05 KG/M2 | RESPIRATION RATE: 17 BRPM

## 2024-11-12 DIAGNOSIS — N40.1 BENIGN PROSTATIC HYPERPLASIA WITH LOWER URINARY TRACT SYMPMS: ICD-10-CM

## 2024-11-12 DIAGNOSIS — R39.9 UNSPECIFIED SYMPTOMS AND SIGNS INVOLVING THE GENITOURINARY SYSTEM: ICD-10-CM

## 2024-11-12 DIAGNOSIS — R31.0 GROSS HEMATURIA: ICD-10-CM

## 2024-11-12 DIAGNOSIS — N13.8 BENIGN PROSTATIC HYPERPLASIA WITH LOWER URINARY TRACT SYMPMS: ICD-10-CM

## 2024-11-12 PROCEDURE — 99204 OFFICE O/P NEW MOD 45 MIN: CPT

## 2024-11-12 RX ORDER — CHROMIUM 200 MCG
TABLET ORAL
Refills: 0 | Status: ACTIVE | COMMUNITY

## 2024-11-14 LAB — URINE CYTOLOGY: NORMAL

## 2024-11-20 ENCOUNTER — NON-APPOINTMENT (OUTPATIENT)
Age: 79
End: 2024-11-20

## 2024-12-06 ENCOUNTER — APPOINTMENT (OUTPATIENT)
Dept: PULMONOLOGY | Facility: CLINIC | Age: 79
End: 2024-12-06
Payer: MEDICARE

## 2024-12-06 VITALS
HEIGHT: 70 IN | SYSTOLIC BLOOD PRESSURE: 146 MMHG | WEIGHT: 184 LBS | DIASTOLIC BLOOD PRESSURE: 80 MMHG | HEART RATE: 70 BPM | OXYGEN SATURATION: 95 % | BODY MASS INDEX: 26.34 KG/M2

## 2024-12-06 DIAGNOSIS — J44.89 OTHER SPECIFIED CHRONIC OBSTRUCTIVE PULMONARY DISEASE: ICD-10-CM

## 2024-12-06 PROCEDURE — 99214 OFFICE O/P EST MOD 30 MIN: CPT | Mod: 25

## 2024-12-06 PROCEDURE — 94010 BREATHING CAPACITY TEST: CPT

## 2024-12-06 NOTE — DISCHARGE NOTE ADULT - MEDICATION SUMMARY - MEDICATIONS TO STOP TAKING
PAST SURGICAL HISTORY:  S/P cystoscopy with ureteral stent placement     S/P cystoscopy with ureteral stent placement     S/P ureteral stent placement 9/2022- left     I will STOP taking the medications listed below when I get home from the hospital:  None

## 2024-12-09 ENCOUNTER — NON-APPOINTMENT (OUTPATIENT)
Age: 79
End: 2024-12-09

## 2024-12-09 ENCOUNTER — APPOINTMENT (OUTPATIENT)
Dept: CARDIOLOGY | Facility: CLINIC | Age: 79
End: 2024-12-09
Payer: MEDICARE

## 2024-12-09 VITALS
SYSTOLIC BLOOD PRESSURE: 145 MMHG | HEIGHT: 61 IN | BODY MASS INDEX: 33.99 KG/M2 | DIASTOLIC BLOOD PRESSURE: 84 MMHG | WEIGHT: 180 LBS | OXYGEN SATURATION: 97 % | HEART RATE: 67 BPM

## 2024-12-09 DIAGNOSIS — I77.89 OTHER SPECIFIED DISORDERS OF ARTERIES AND ARTERIOLES: ICD-10-CM

## 2024-12-09 DIAGNOSIS — I25.10 ATHEROSCLEROTIC HEART DISEASE OF NATIVE CORONARY ARTERY W/OUT ANGINA PECTORIS: ICD-10-CM

## 2024-12-09 PROCEDURE — G2211 COMPLEX E/M VISIT ADD ON: CPT

## 2024-12-09 PROCEDURE — 99214 OFFICE O/P EST MOD 30 MIN: CPT

## 2024-12-09 PROCEDURE — 93000 ELECTROCARDIOGRAM COMPLETE: CPT

## 2024-12-10 RX ORDER — UMECLIDINIUM BROMIDE AND VILANTEROL TRIFENATATE 62.5; 25 UG/1; UG/1
62.5-25 POWDER RESPIRATORY (INHALATION)
Qty: 90 | Refills: 1 | Status: ACTIVE | COMMUNITY
Start: 2024-12-10 | End: 1900-01-01

## 2025-01-23 ENCOUNTER — APPOINTMENT (OUTPATIENT)
Dept: PULMONOLOGY | Facility: CLINIC | Age: 80
End: 2025-01-23

## 2025-02-05 ENCOUNTER — NON-APPOINTMENT (OUTPATIENT)
Age: 80
End: 2025-02-05

## 2025-02-18 ENCOUNTER — APPOINTMENT (OUTPATIENT)
Dept: THORACIC SURGERY | Facility: CLINIC | Age: 80
End: 2025-02-18
Payer: MEDICARE

## 2025-02-18 VITALS
DIASTOLIC BLOOD PRESSURE: 91 MMHG | HEART RATE: 65 BPM | OXYGEN SATURATION: 97 % | WEIGHT: 178 LBS | BODY MASS INDEX: 25.48 KG/M2 | SYSTOLIC BLOOD PRESSURE: 159 MMHG | HEIGHT: 70 IN

## 2025-02-18 DIAGNOSIS — C34.91 MALIGNANT NEOPLASM OF UNSPECIFIED PART OF RIGHT BRONCHUS OR LUNG: ICD-10-CM

## 2025-02-18 PROCEDURE — 99213 OFFICE O/P EST LOW 20 MIN: CPT

## 2025-04-02 ENCOUNTER — APPOINTMENT (OUTPATIENT)
Dept: CARDIOLOGY | Facility: CLINIC | Age: 80
End: 2025-04-02
Payer: MEDICARE

## 2025-04-02 ENCOUNTER — NON-APPOINTMENT (OUTPATIENT)
Age: 80
End: 2025-04-02

## 2025-04-02 VITALS
BODY MASS INDEX: 26.2 KG/M2 | HEIGHT: 70 IN | SYSTOLIC BLOOD PRESSURE: 146 MMHG | HEART RATE: 69 BPM | OXYGEN SATURATION: 96 % | WEIGHT: 183 LBS | DIASTOLIC BLOOD PRESSURE: 83 MMHG

## 2025-04-02 DIAGNOSIS — I10 ESSENTIAL (PRIMARY) HYPERTENSION: ICD-10-CM

## 2025-04-02 DIAGNOSIS — R06.02 SHORTNESS OF BREATH: ICD-10-CM

## 2025-04-02 PROCEDURE — 93000 ELECTROCARDIOGRAM COMPLETE: CPT

## 2025-04-02 PROCEDURE — G2211 COMPLEX E/M VISIT ADD ON: CPT

## 2025-04-02 PROCEDURE — 99215 OFFICE O/P EST HI 40 MIN: CPT

## 2025-04-09 ENCOUNTER — NON-APPOINTMENT (OUTPATIENT)
Age: 80
End: 2025-04-09

## 2025-04-14 ENCOUNTER — OUTPATIENT (OUTPATIENT)
Dept: OUTPATIENT SERVICES | Facility: HOSPITAL | Age: 80
LOS: 1 days | End: 2025-04-14
Payer: MEDICARE

## 2025-04-14 ENCOUNTER — APPOINTMENT (OUTPATIENT)
Dept: CT IMAGING | Facility: CLINIC | Age: 80
End: 2025-04-14
Payer: MEDICARE

## 2025-04-14 DIAGNOSIS — Z98.890 OTHER SPECIFIED POSTPROCEDURAL STATES: Chronic | ICD-10-CM

## 2025-04-14 DIAGNOSIS — R06.02 SHORTNESS OF BREATH: ICD-10-CM

## 2025-04-14 DIAGNOSIS — K86.2 CYST OF PANCREAS: Chronic | ICD-10-CM

## 2025-04-14 PROCEDURE — 75574 CT ANGIO HRT W/3D IMAGE: CPT

## 2025-04-14 PROCEDURE — 75574 CT ANGIO HRT W/3D IMAGE: CPT | Mod: 26

## 2025-04-16 ENCOUNTER — OUTPATIENT (OUTPATIENT)
Dept: OUTPATIENT SERVICES | Facility: HOSPITAL | Age: 80
LOS: 1 days | End: 2025-04-16
Payer: MEDICARE

## 2025-04-16 ENCOUNTER — APPOINTMENT (OUTPATIENT)
Dept: CARDIOLOGY | Facility: CLINIC | Age: 80
End: 2025-04-16
Payer: MEDICARE

## 2025-04-16 ENCOUNTER — RESULT REVIEW (OUTPATIENT)
Age: 80
End: 2025-04-16

## 2025-04-16 VITALS
BODY MASS INDEX: 25.05 KG/M2 | SYSTOLIC BLOOD PRESSURE: 131 MMHG | HEART RATE: 65 BPM | DIASTOLIC BLOOD PRESSURE: 82 MMHG | WEIGHT: 175 LBS | HEIGHT: 70 IN | OXYGEN SATURATION: 96 %

## 2025-04-16 DIAGNOSIS — K86.2 CYST OF PANCREAS: Chronic | ICD-10-CM

## 2025-04-16 DIAGNOSIS — Z98.890 OTHER SPECIFIED POSTPROCEDURAL STATES: Chronic | ICD-10-CM

## 2025-04-16 DIAGNOSIS — I10 ESSENTIAL (PRIMARY) HYPERTENSION: ICD-10-CM

## 2025-04-16 DIAGNOSIS — Z00.8 ENCOUNTER FOR OTHER GENERAL EXAMINATION: ICD-10-CM

## 2025-04-16 DIAGNOSIS — R94.39 ABNORMAL RESULT OF OTHER CARDIOVASCULAR FUNCTION STUDY: ICD-10-CM

## 2025-04-16 PROCEDURE — 75580 N-INVAS EST C FFR SW ALY CTA: CPT | Mod: 26

## 2025-04-16 PROCEDURE — 93000 ELECTROCARDIOGRAM COMPLETE: CPT

## 2025-04-16 PROCEDURE — 75580 N-INVAS EST C FFR SW ALY CTA: CPT

## 2025-04-16 PROCEDURE — 99215 OFFICE O/P EST HI 40 MIN: CPT

## 2025-04-17 ENCOUNTER — APPOINTMENT (OUTPATIENT)
Dept: UROLOGY | Facility: CLINIC | Age: 80
End: 2025-04-17

## 2025-05-08 NOTE — H&P PST ADULT - NS NEC GEN PE MLT EXAM PC
[facility]    CC: Nausea/Vomiting     HPI 70 y.o. 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 Patient initially had persistent nausea/vomiting not improving with antiemetics (zofran) thought to be chemo induced. Patient was started on budesonide with  oncology with resolution of symptoms.     Past Medical History  Past Medical History:   Diagnosis Date    Anticoagulant long-term use     Coronary artery disease     Hypertension     Myelodysplastic syndrome     Peripheral vascular disease, unspecified      Physical Examination  There were no vitals taken for this visit.  General appearance: alert, cooperative, no distress  Lungs: clear to auscultation bilaterally, no dullness to percussion bilaterally  Heart: regular rate and rhythm without rub; no displacement of the PMI   Abdomen: soft, non-tender; bowel sounds normoactive; no organomegaly    Assessment:   70 y.o. 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 Initially, patient had persistent nausea/vomiting thought to be chemo induced which is now improved since starting budesonide.     Plan:  - Risks and benefits and procedure discussed with patient   - Proceed with EGD with biopsies.     Hayden Stallings MD  [facility] Fellow, PGY IV           
detailed exam

## 2025-06-10 ENCOUNTER — APPOINTMENT (OUTPATIENT)
Dept: CARDIOLOGY | Facility: CLINIC | Age: 80
End: 2025-06-10
Payer: MEDICARE

## 2025-06-10 ENCOUNTER — APPOINTMENT (OUTPATIENT)
Dept: CARDIOLOGY | Facility: CLINIC | Age: 80
End: 2025-06-10

## 2025-06-10 VITALS — DIASTOLIC BLOOD PRESSURE: 80 MMHG | SYSTOLIC BLOOD PRESSURE: 130 MMHG

## 2025-06-10 VITALS
OXYGEN SATURATION: 93 % | WEIGHT: 182 LBS | BODY MASS INDEX: 26.05 KG/M2 | HEART RATE: 71 BPM | HEIGHT: 70 IN | DIASTOLIC BLOOD PRESSURE: 86 MMHG | SYSTOLIC BLOOD PRESSURE: 138 MMHG

## 2025-06-10 PROCEDURE — 93000 ELECTROCARDIOGRAM COMPLETE: CPT

## 2025-06-10 PROCEDURE — 99214 OFFICE O/P EST MOD 30 MIN: CPT

## 2025-06-10 PROCEDURE — G2211 COMPLEX E/M VISIT ADD ON: CPT

## 2025-06-10 RX ORDER — ROSUVASTATIN CALCIUM 10 MG/1
10 TABLET, FILM COATED ORAL
Qty: 90 | Refills: 0 | Status: ACTIVE | COMMUNITY
Start: 2025-06-10 | End: 1900-01-01

## 2025-06-30 ENCOUNTER — NON-APPOINTMENT (OUTPATIENT)
Age: 80
End: 2025-06-30

## 2025-08-04 ENCOUNTER — APPOINTMENT (OUTPATIENT)
Dept: PULMONOLOGY | Facility: CLINIC | Age: 80
End: 2025-08-04
Payer: MEDICARE

## 2025-08-04 VITALS
OXYGEN SATURATION: 95 % | HEART RATE: 68 BPM | HEIGHT: 67 IN | DIASTOLIC BLOOD PRESSURE: 88 MMHG | SYSTOLIC BLOOD PRESSURE: 160 MMHG | BODY MASS INDEX: 29.66 KG/M2 | WEIGHT: 189 LBS

## 2025-08-04 DIAGNOSIS — J44.89 OTHER SPECIFIED CHRONIC OBSTRUCTIVE PULMONARY DISEASE: ICD-10-CM

## 2025-08-04 PROCEDURE — 94727 GAS DIL/WSHOT DETER LNG VOL: CPT

## 2025-08-04 PROCEDURE — 94010 BREATHING CAPACITY TEST: CPT

## 2025-08-04 PROCEDURE — ZZZZZ: CPT

## 2025-08-04 PROCEDURE — 99213 OFFICE O/P EST LOW 20 MIN: CPT | Mod: 25

## 2025-08-04 PROCEDURE — 94729 DIFFUSING CAPACITY: CPT

## 2025-08-14 ENCOUNTER — APPOINTMENT (OUTPATIENT)
Dept: CARDIOLOGY | Facility: CLINIC | Age: 80
End: 2025-08-14
Payer: MEDICARE

## 2025-08-14 VITALS
OXYGEN SATURATION: 95 % | WEIGHT: 184 LBS | HEART RATE: 65 BPM | HEIGHT: 67 IN | BODY MASS INDEX: 28.88 KG/M2 | DIASTOLIC BLOOD PRESSURE: 93 MMHG | SYSTOLIC BLOOD PRESSURE: 160 MMHG

## 2025-08-14 DIAGNOSIS — I77.89 OTHER SPECIFIED DISORDERS OF ARTERIES AND ARTERIOLES: ICD-10-CM

## 2025-08-14 DIAGNOSIS — R06.09 OTHER FORMS OF DYSPNEA: ICD-10-CM

## 2025-08-14 DIAGNOSIS — E78.5 HYPERLIPIDEMIA, UNSPECIFIED: ICD-10-CM

## 2025-08-14 PROCEDURE — G2211 COMPLEX E/M VISIT ADD ON: CPT

## 2025-08-14 PROCEDURE — 93000 ELECTROCARDIOGRAM COMPLETE: CPT

## 2025-08-14 PROCEDURE — 99214 OFFICE O/P EST MOD 30 MIN: CPT

## 2025-08-21 ENCOUNTER — APPOINTMENT (OUTPATIENT)
Dept: UROLOGY | Facility: CLINIC | Age: 80
End: 2025-08-21
Payer: MEDICARE

## 2025-08-21 DIAGNOSIS — N13.8 BENIGN PROSTATIC HYPERPLASIA WITH LOWER URINARY TRACT SYMPMS: ICD-10-CM

## 2025-08-21 DIAGNOSIS — R39.9 UNSPECIFIED SYMPTOMS AND SIGNS INVOLVING THE GENITOURINARY SYSTEM: ICD-10-CM

## 2025-08-21 DIAGNOSIS — N40.1 BENIGN PROSTATIC HYPERPLASIA WITH LOWER URINARY TRACT SYMPMS: ICD-10-CM

## 2025-08-21 PROCEDURE — G2211 COMPLEX E/M VISIT ADD ON: CPT

## 2025-08-21 PROCEDURE — 99213 OFFICE O/P EST LOW 20 MIN: CPT

## 2025-08-25 LAB — URINE CYTOLOGY: NORMAL
